# Patient Record
Sex: MALE | Race: NATIVE HAWAIIAN OR OTHER PACIFIC ISLANDER | Employment: OTHER | URBAN - METROPOLITAN AREA
[De-identification: names, ages, dates, MRNs, and addresses within clinical notes are randomized per-mention and may not be internally consistent; named-entity substitution may affect disease eponyms.]

---

## 2018-01-01 ENCOUNTER — APPOINTMENT (OUTPATIENT)
Dept: GENERAL RADIOLOGY | Age: 83
DRG: 283 | End: 2018-01-01
Attending: EMERGENCY MEDICINE
Payer: MEDICARE

## 2018-01-01 ENCOUNTER — HOSPITAL ENCOUNTER (EMERGENCY)
Age: 83
Discharge: HOME | End: 2018-02-14
Attending: EMERGENCY MEDICINE | Admitting: EMERGENCY MEDICINE
Payer: MEDICARE

## 2018-01-01 ENCOUNTER — HOSPITAL ENCOUNTER (INPATIENT)
Age: 83
LOS: 5 days | DRG: 283 | End: 2018-02-25
Attending: EMERGENCY MEDICINE | Admitting: INTERNAL MEDICINE
Payer: MEDICARE

## 2018-01-01 ENCOUNTER — APPOINTMENT (OUTPATIENT)
Dept: CT IMAGING | Age: 83
DRG: 283 | End: 2018-01-01
Attending: INTERNAL MEDICINE
Payer: MEDICARE

## 2018-01-01 ENCOUNTER — APPOINTMENT (OUTPATIENT)
Dept: ULTRASOUND IMAGING | Age: 83
DRG: 283 | End: 2018-01-01
Attending: INTERNAL MEDICINE
Payer: MEDICARE

## 2018-01-01 ENCOUNTER — APPOINTMENT (OUTPATIENT)
Dept: CT IMAGING | Age: 83
End: 2018-01-01
Attending: PHYSICIAN ASSISTANT
Payer: MEDICARE

## 2018-01-01 ENCOUNTER — APPOINTMENT (OUTPATIENT)
Dept: GENERAL RADIOLOGY | Age: 83
DRG: 283 | End: 2018-01-01
Attending: PHYSICIAN ASSISTANT
Payer: MEDICARE

## 2018-01-01 ENCOUNTER — APPOINTMENT (OUTPATIENT)
Dept: GENERAL RADIOLOGY | Age: 83
DRG: 283 | End: 2018-01-01
Attending: INTERNAL MEDICINE
Payer: MEDICARE

## 2018-01-01 ENCOUNTER — APPOINTMENT (OUTPATIENT)
Dept: GENERAL RADIOLOGY | Age: 83
End: 2018-01-01
Attending: PHYSICIAN ASSISTANT
Payer: MEDICARE

## 2018-01-01 ENCOUNTER — APPOINTMENT (OUTPATIENT)
Dept: GENERAL RADIOLOGY | Age: 83
End: 2018-01-01
Attending: EMERGENCY MEDICINE
Payer: MEDICARE

## 2018-01-01 ENCOUNTER — HOSPITAL ENCOUNTER (EMERGENCY)
Age: 83
Discharge: HOME | End: 2018-01-15
Attending: EMERGENCY MEDICINE | Admitting: EMERGENCY MEDICINE
Payer: MEDICARE

## 2018-01-01 VITALS
HEIGHT: 66 IN | WEIGHT: 142.6 LBS | BODY MASS INDEX: 22.92 KG/M2 | TEMPERATURE: 98.2 F | OXYGEN SATURATION: 93 % | RESPIRATION RATE: 42 BRPM | DIASTOLIC BLOOD PRESSURE: 64 MMHG | HEART RATE: 120 BPM | SYSTOLIC BLOOD PRESSURE: 115 MMHG

## 2018-01-01 VITALS
SYSTOLIC BLOOD PRESSURE: 116 MMHG | RESPIRATION RATE: 20 BRPM | BODY MASS INDEX: 29.16 KG/M2 | DIASTOLIC BLOOD PRESSURE: 90 MMHG | OXYGEN SATURATION: 99 % | HEIGHT: 65 IN | TEMPERATURE: 97.8 F | WEIGHT: 175 LBS | HEART RATE: 93 BPM

## 2018-01-01 VITALS
RESPIRATION RATE: 22 BRPM | SYSTOLIC BLOOD PRESSURE: 132 MMHG | TEMPERATURE: 97.9 F | DIASTOLIC BLOOD PRESSURE: 67 MMHG | HEART RATE: 78 BPM | OXYGEN SATURATION: 100 %
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
## 2018-01-01 DIAGNOSIS — S09.90XA CLOSED HEAD INJURY, INITIAL ENCOUNTER: Primary | ICD-10-CM

## 2018-01-01 DIAGNOSIS — N17.9 ACUTE RENAL FAILURE, UNSPECIFIED ACUTE RENAL FAILURE TYPE (HCC): ICD-10-CM

## 2018-01-01 DIAGNOSIS — I50.21 ACUTE SYSTOLIC CONGESTIVE HEART FAILURE (HCC): ICD-10-CM

## 2018-01-01 DIAGNOSIS — I21.4 NSTEMI (NON-ST ELEVATED MYOCARDIAL INFARCTION) (HCC): Primary | ICD-10-CM

## 2018-01-01 DIAGNOSIS — Z71.89 ADVANCE CARE PLANNING: ICD-10-CM

## 2018-01-01 DIAGNOSIS — R09.89 CHOKING EPISODE: Primary | ICD-10-CM

## 2018-01-01 DIAGNOSIS — R06.02 SOB (SHORTNESS OF BREATH): ICD-10-CM

## 2018-01-01 DIAGNOSIS — J81.0 ACUTE PULMONARY EDEMA (HCC): ICD-10-CM

## 2018-01-01 DIAGNOSIS — R53.81 DEBILITY: ICD-10-CM

## 2018-01-01 LAB
ALBUMIN SERPL-MCNC: 2.9 G/DL (ref 3.4–5)
ALBUMIN SERPL-MCNC: 3.3 G/DL (ref 3.4–5)
ALBUMIN SERPL-MCNC: 3.5 G/DL (ref 3.4–5)
ALBUMIN/GLOB SERPL: 0.7 {RATIO} (ref 0.8–1.7)
ALBUMIN/GLOB SERPL: 0.8 {RATIO} (ref 0.8–1.7)
ALBUMIN/GLOB SERPL: 0.9 {RATIO} (ref 0.8–1.7)
ALP SERPL-CCNC: 84 U/L (ref 45–117)
ALP SERPL-CCNC: 90 U/L (ref 45–117)
ALP SERPL-CCNC: 93 U/L (ref 45–117)
ALT SERPL-CCNC: 52 U/L (ref 16–61)
ALT SERPL-CCNC: 60 U/L (ref 16–61)
ALT SERPL-CCNC: 853 U/L (ref 16–61)
ANION GAP SERPL CALC-SCNC: 11 MMOL/L (ref 3–18)
ANION GAP SERPL CALC-SCNC: 12 MMOL/L (ref 3–18)
ANION GAP SERPL CALC-SCNC: 13 MMOL/L (ref 3–18)
ANION GAP SERPL CALC-SCNC: 13 MMOL/L (ref 3–18)
ANION GAP SERPL CALC-SCNC: 14 MMOL/L (ref 3–18)
ANION GAP SERPL CALC-SCNC: 17 MMOL/L (ref 3–18)
APPEARANCE UR: ABNORMAL
APTT PPP: 122.2 SEC (ref 23–36.4)
APTT PPP: 63.1 SEC (ref 23–36.4)
APTT PPP: 64.9 SEC (ref 23–36.4)
APTT PPP: 72.7 SEC (ref 23–36.4)
APTT PPP: 75.6 SEC (ref 23–36.4)
APTT PPP: 77.7 SEC (ref 23–36.4)
APTT PPP: 79 SEC (ref 23–36.4)
APTT PPP: 84.9 SEC (ref 23–36.4)
APTT PPP: 92.2 SEC (ref 23–36.4)
AST SERPL-CCNC: 168 U/L (ref 15–37)
AST SERPL-CCNC: 238 U/L (ref 15–37)
AST SERPL-CCNC: 928 U/L (ref 15–37)
ATRIAL RATE: 108 BPM
ATRIAL RATE: 113 BPM
ATRIAL RATE: 122 BPM
ATRIAL RATE: 122 BPM
ATRIAL RATE: 77 BPM
BACTERIA SPEC CULT: NORMAL
BACTERIA URNS QL MICRO: ABNORMAL /HPF
BASOPHILS # BLD: 0 K/UL (ref 0–0.06)
BASOPHILS # BLD: 0 K/UL (ref 0–0.06)
BASOPHILS NFR BLD: 0 % (ref 0–2)
BASOPHILS NFR BLD: 0 % (ref 0–2)
BILIRUB DIRECT SERPL-MCNC: 0.4 MG/DL (ref 0–0.2)
BILIRUB SERPL-MCNC: 0.5 MG/DL (ref 0.2–1)
BILIRUB SERPL-MCNC: 0.6 MG/DL (ref 0.2–1)
BILIRUB SERPL-MCNC: 0.8 MG/DL (ref 0.2–1)
BILIRUB UR QL: NEGATIVE
BNP SERPL-MCNC: 7699 PG/ML (ref 0–1800)
BNP SERPL-MCNC: ABNORMAL PG/ML (ref 0–1800)
BNP SERPL-MCNC: ABNORMAL PG/ML (ref 0–1800)
BUN SERPL-MCNC: 110 MG/DL (ref 7–18)
BUN SERPL-MCNC: 34 MG/DL (ref 7–18)
BUN SERPL-MCNC: 36 MG/DL (ref 7–18)
BUN SERPL-MCNC: 40 MG/DL (ref 7–18)
BUN SERPL-MCNC: 61 MG/DL (ref 7–18)
BUN SERPL-MCNC: 70 MG/DL (ref 7–18)
BUN SERPL-MCNC: 77 MG/DL (ref 7–18)
BUN SERPL-MCNC: 78 MG/DL (ref 7–18)
BUN/CREAT SERPL: 25 (ref 12–20)
BUN/CREAT SERPL: 28 (ref 12–20)
BUN/CREAT SERPL: 30 (ref 12–20)
BUN/CREAT SERPL: 31 (ref 12–20)
BUN/CREAT SERPL: 32 (ref 12–20)
BUN/CREAT SERPL: 35 (ref 12–20)
BUN/CREAT SERPL: 42 (ref 12–20)
BUN/CREAT SERPL: 42 (ref 12–20)
CALCIUM SERPL-MCNC: 7.9 MG/DL (ref 8.5–10.1)
CALCIUM SERPL-MCNC: 8.3 MG/DL (ref 8.5–10.1)
CALCIUM SERPL-MCNC: 8.4 MG/DL (ref 8.5–10.1)
CALCIUM SERPL-MCNC: 8.7 MG/DL (ref 8.5–10.1)
CALCIUM SERPL-MCNC: 8.7 MG/DL (ref 8.5–10.1)
CALCIUM SERPL-MCNC: 8.8 MG/DL (ref 8.5–10.1)
CALCIUM SERPL-MCNC: 8.9 MG/DL (ref 8.5–10.1)
CALCIUM SERPL-MCNC: 9.1 MG/DL (ref 8.5–10.1)
CALCULATED P AXIS, ECG09: 33 DEGREES
CALCULATED P AXIS, ECG09: 41 DEGREES
CALCULATED P AXIS, ECG09: 43 DEGREES
CALCULATED P AXIS, ECG09: 46 DEGREES
CALCULATED P AXIS, ECG09: 55 DEGREES
CALCULATED R AXIS, ECG10: -7 DEGREES
CALCULATED R AXIS, ECG10: 18 DEGREES
CALCULATED R AXIS, ECG10: 24 DEGREES
CALCULATED R AXIS, ECG10: 25 DEGREES
CALCULATED R AXIS, ECG10: 43 DEGREES
CALCULATED T AXIS, ECG11: -34 DEGREES
CALCULATED T AXIS, ECG11: 17 DEGREES
CALCULATED T AXIS, ECG11: 22 DEGREES
CALCULATED T AXIS, ECG11: 54 DEGREES
CALCULATED T AXIS, ECG11: 84 DEGREES
CHLORIDE SERPL-SCNC: 111 MMOL/L (ref 100–108)
CHLORIDE SERPL-SCNC: 111 MMOL/L (ref 100–108)
CHLORIDE SERPL-SCNC: 112 MMOL/L (ref 100–108)
CHLORIDE SERPL-SCNC: 113 MMOL/L (ref 100–108)
CHLORIDE SERPL-SCNC: 115 MMOL/L (ref 100–108)
CHLORIDE SERPL-SCNC: 122 MMOL/L (ref 100–108)
CHOLEST SERPL-MCNC: 140 MG/DL
CK MB CFR SERPL CALC: 10.4 % (ref 0–4)
CK MB CFR SERPL CALC: 12.7 % (ref 0–4)
CK MB CFR SERPL CALC: 4.2 % (ref 0–4)
CK MB CFR SERPL CALC: 5.1 % (ref 0–4)
CK MB SERPL-MCNC: 10.3 NG/ML (ref 5–25)
CK MB SERPL-MCNC: 143.6 NG/ML (ref 5–25)
CK MB SERPL-MCNC: 151.4 NG/ML (ref 5–25)
CK MB SERPL-MCNC: 59.9 NG/ML (ref 5–25)
CK SERPL-CCNC: 1164 U/L (ref 39–308)
CK SERPL-CCNC: 1192 U/L (ref 39–308)
CK SERPL-CCNC: 1383 U/L (ref 39–308)
CK SERPL-CCNC: 245 U/L (ref 39–308)
CO2 SERPL-SCNC: 17 MMOL/L (ref 21–32)
CO2 SERPL-SCNC: 18 MMOL/L (ref 21–32)
CO2 SERPL-SCNC: 19 MMOL/L (ref 21–32)
CO2 SERPL-SCNC: 20 MMOL/L (ref 21–32)
CO2 SERPL-SCNC: 21 MMOL/L (ref 21–32)
CO2 SERPL-SCNC: 22 MMOL/L (ref 21–32)
CO2 SERPL-SCNC: 23 MMOL/L (ref 21–32)
CO2 SERPL-SCNC: 24 MMOL/L (ref 21–32)
COLOR UR: ABNORMAL
CREAT SERPL-MCNC: 1.15 MG/DL (ref 0.6–1.3)
CREAT SERPL-MCNC: 1.18 MG/DL (ref 0.6–1.3)
CREAT SERPL-MCNC: 1.59 MG/DL (ref 0.6–1.3)
CREAT SERPL-MCNC: 1.86 MG/DL (ref 0.6–1.3)
CREAT SERPL-MCNC: 2.18 MG/DL (ref 0.6–1.3)
CREAT SERPL-MCNC: 2.2 MG/DL (ref 0.6–1.3)
CREAT SERPL-MCNC: 2.21 MG/DL (ref 0.6–1.3)
CREAT SERPL-MCNC: 2.62 MG/DL (ref 0.6–1.3)
DIAGNOSIS, 93000: NORMAL
DIFFERENTIAL METHOD BLD: ABNORMAL
DIFFERENTIAL METHOD BLD: ABNORMAL
EOSINOPHIL # BLD: 0 K/UL (ref 0–0.4)
EOSINOPHIL # BLD: 0 K/UL (ref 0–0.4)
EOSINOPHIL NFR BLD: 0 % (ref 0–5)
EOSINOPHIL NFR BLD: 0 % (ref 0–5)
EPITH CASTS URNS QL MICRO: ABNORMAL /LPF (ref 0–5)
ERYTHROCYTE [DISTWIDTH] IN BLOOD BY AUTOMATED COUNT: 14.9 % (ref 11.6–14.5)
ERYTHROCYTE [DISTWIDTH] IN BLOOD BY AUTOMATED COUNT: 15.1 % (ref 11.6–14.5)
ERYTHROCYTE [DISTWIDTH] IN BLOOD BY AUTOMATED COUNT: 15.2 % (ref 11.6–14.5)
ERYTHROCYTE [DISTWIDTH] IN BLOOD BY AUTOMATED COUNT: 15.3 % (ref 11.6–14.5)
ERYTHROCYTE [DISTWIDTH] IN BLOOD BY AUTOMATED COUNT: 15.3 % (ref 11.6–14.5)
ERYTHROCYTE [DISTWIDTH] IN BLOOD BY AUTOMATED COUNT: 15.5 % (ref 11.6–14.5)
GLOBULIN SER CALC-MCNC: 3.8 G/DL (ref 2–4)
GLOBULIN SER CALC-MCNC: 4 G/DL (ref 2–4)
GLOBULIN SER CALC-MCNC: 4.2 G/DL (ref 2–4)
GLUCOSE SERPL-MCNC: 107 MG/DL (ref 74–99)
GLUCOSE SERPL-MCNC: 111 MG/DL (ref 74–99)
GLUCOSE SERPL-MCNC: 120 MG/DL (ref 74–99)
GLUCOSE SERPL-MCNC: 121 MG/DL (ref 74–99)
GLUCOSE SERPL-MCNC: 127 MG/DL (ref 74–99)
GLUCOSE SERPL-MCNC: 135 MG/DL (ref 74–99)
GLUCOSE SERPL-MCNC: 137 MG/DL (ref 74–99)
GLUCOSE SERPL-MCNC: 266 MG/DL (ref 74–99)
GLUCOSE UR STRIP.AUTO-MCNC: NEGATIVE MG/DL
HBA1C MFR BLD: 5.6 % (ref 4.2–5.6)
HCT VFR BLD AUTO: 36.7 % (ref 36–48)
HCT VFR BLD AUTO: 37.2 % (ref 36–48)
HCT VFR BLD AUTO: 40.9 % (ref 36–48)
HCT VFR BLD AUTO: 41.3 % (ref 36–48)
HCT VFR BLD AUTO: 42.1 % (ref 36–48)
HCT VFR BLD AUTO: 45.3 % (ref 36–48)
HDLC SERPL-MCNC: 57 MG/DL (ref 40–60)
HDLC SERPL: 2.5 {RATIO} (ref 0–5)
HGB BLD-MCNC: 12.2 G/DL (ref 13–16)
HGB BLD-MCNC: 12.2 G/DL (ref 13–16)
HGB BLD-MCNC: 13.5 G/DL (ref 13–16)
HGB BLD-MCNC: 13.7 G/DL (ref 13–16)
HGB BLD-MCNC: 13.8 G/DL (ref 13–16)
HGB BLD-MCNC: 14.5 G/DL (ref 13–16)
HGB UR QL STRIP: ABNORMAL
INR PPP: 1.2 (ref 0.8–1.2)
KETONES UR QL STRIP.AUTO: ABNORMAL MG/DL
LACTATE BLD-SCNC: 2.4 MMOL/L (ref 0.4–2)
LDLC SERPL CALC-MCNC: 66.6 MG/DL (ref 0–100)
LEUKOCYTE ESTERASE UR QL STRIP.AUTO: ABNORMAL
LIPID PROFILE,FLP: NORMAL
LYMPHOCYTES # BLD: 0.7 K/UL (ref 0.9–3.6)
LYMPHOCYTES # BLD: 0.9 K/UL (ref 0.9–3.6)
LYMPHOCYTES NFR BLD: 9 % (ref 21–52)
LYMPHOCYTES NFR BLD: 9 % (ref 21–52)
MAGNESIUM SERPL-MCNC: 2.1 MG/DL (ref 1.6–2.6)
MAGNESIUM SERPL-MCNC: 2.9 MG/DL (ref 1.6–2.6)
MCH RBC QN AUTO: 29.1 PG (ref 24–34)
MCH RBC QN AUTO: 29.5 PG (ref 24–34)
MCH RBC QN AUTO: 29.5 PG (ref 24–34)
MCH RBC QN AUTO: 29.6 PG (ref 24–34)
MCHC RBC AUTO-ENTMCNC: 32 G/DL (ref 31–37)
MCHC RBC AUTO-ENTMCNC: 32.8 G/DL (ref 31–37)
MCHC RBC AUTO-ENTMCNC: 32.8 G/DL (ref 31–37)
MCHC RBC AUTO-ENTMCNC: 33 G/DL (ref 31–37)
MCHC RBC AUTO-ENTMCNC: 33.2 G/DL (ref 31–37)
MCHC RBC AUTO-ENTMCNC: 33.2 G/DL (ref 31–37)
MCV RBC AUTO: 88.8 FL (ref 74–97)
MCV RBC AUTO: 88.9 FL (ref 74–97)
MCV RBC AUTO: 89.2 FL (ref 74–97)
MCV RBC AUTO: 89.5 FL (ref 74–97)
MCV RBC AUTO: 90.3 FL (ref 74–97)
MCV RBC AUTO: 92.4 FL (ref 74–97)
MONOCYTES # BLD: 0.4 K/UL (ref 0.05–1.2)
MONOCYTES # BLD: 0.8 K/UL (ref 0.05–1.2)
MONOCYTES NFR BLD: 4 % (ref 3–10)
MONOCYTES NFR BLD: 8 % (ref 3–10)
NEUTS SEG # BLD: 7 K/UL (ref 1.8–8)
NEUTS SEG # BLD: 8.2 K/UL (ref 1.8–8)
NEUTS SEG NFR BLD: 83 % (ref 40–73)
NEUTS SEG NFR BLD: 87 % (ref 40–73)
NITRITE UR QL STRIP.AUTO: NEGATIVE
P-R INTERVAL, ECG05: 160 MS
P-R INTERVAL, ECG05: 160 MS
P-R INTERVAL, ECG05: 162 MS
P-R INTERVAL, ECG05: 182 MS
P-R INTERVAL, ECG05: 184 MS
PH UR STRIP: 5 [PH] (ref 5–8)
PHOSPHATE SERPL-MCNC: 4 MG/DL (ref 2.5–4.9)
PHOSPHATE SERPL-MCNC: 4.6 MG/DL (ref 2.5–4.9)
PLATELET # BLD AUTO: 149 K/UL (ref 135–420)
PLATELET # BLD AUTO: 217 K/UL (ref 135–420)
PLATELET # BLD AUTO: 238 K/UL (ref 135–420)
PLATELET # BLD AUTO: 244 K/UL (ref 135–420)
PLATELET # BLD AUTO: 247 K/UL (ref 135–420)
PLATELET # BLD AUTO: 251 K/UL (ref 135–420)
PMV BLD AUTO: 11 FL (ref 9.2–11.8)
PMV BLD AUTO: 11 FL (ref 9.2–11.8)
PMV BLD AUTO: 11.6 FL (ref 9.2–11.8)
PMV BLD AUTO: 11.7 FL (ref 9.2–11.8)
PMV BLD AUTO: 11.8 FL (ref 9.2–11.8)
PMV BLD AUTO: 12 FL (ref 9.2–11.8)
POTASSIUM SERPL-SCNC: 3.9 MMOL/L (ref 3.5–5.5)
POTASSIUM SERPL-SCNC: 4.1 MMOL/L (ref 3.5–5.5)
POTASSIUM SERPL-SCNC: 4.3 MMOL/L (ref 3.5–5.5)
POTASSIUM SERPL-SCNC: 4.3 MMOL/L (ref 3.5–5.5)
POTASSIUM SERPL-SCNC: 4.4 MMOL/L (ref 3.5–5.5)
POTASSIUM SERPL-SCNC: 4.6 MMOL/L (ref 3.5–5.5)
PROT SERPL-MCNC: 6.9 G/DL (ref 6.4–8.2)
PROT SERPL-MCNC: 7.1 G/DL (ref 6.4–8.2)
PROT SERPL-MCNC: 7.7 G/DL (ref 6.4–8.2)
PROT UR STRIP-MCNC: 100 MG/DL
PROTHROMBIN TIME: 14.6 SEC (ref 11.5–15.2)
Q-T INTERVAL, ECG07: 326 MS
Q-T INTERVAL, ECG07: 328 MS
Q-T INTERVAL, ECG07: 336 MS
Q-T INTERVAL, ECG07: 344 MS
Q-T INTERVAL, ECG07: 358 MS
QRS DURATION, ECG06: 100 MS
QRS DURATION, ECG06: 104 MS
QRS DURATION, ECG06: 86 MS
QRS DURATION, ECG06: 88 MS
QRS DURATION, ECG06: 92 MS
QTC CALCULATION (BEZET), ECG08: 405 MS
QTC CALCULATION (BEZET), ECG08: 460 MS
QTC CALCULATION (BEZET), ECG08: 460 MS
QTC CALCULATION (BEZET), ECG08: 464 MS
QTC CALCULATION (BEZET), ECG08: 467 MS
RBC # BLD AUTO: 4.13 M/UL (ref 4.7–5.5)
RBC # BLD AUTO: 4.19 M/UL (ref 4.7–5.5)
RBC # BLD AUTO: 4.57 M/UL (ref 4.7–5.5)
RBC # BLD AUTO: 4.63 M/UL (ref 4.7–5.5)
RBC # BLD AUTO: 4.66 M/UL (ref 4.7–5.5)
RBC # BLD AUTO: 4.9 M/UL (ref 4.7–5.5)
RBC #/AREA URNS HPF: ABNORMAL /HPF (ref 0–5)
SERVICE CMNT-IMP: NORMAL
SODIUM SERPL-SCNC: 146 MMOL/L (ref 136–145)
SODIUM SERPL-SCNC: 146 MMOL/L (ref 136–145)
SODIUM SERPL-SCNC: 147 MMOL/L (ref 136–145)
SODIUM SERPL-SCNC: 147 MMOL/L (ref 136–145)
SODIUM SERPL-SCNC: 148 MMOL/L (ref 136–145)
SODIUM SERPL-SCNC: 148 MMOL/L (ref 136–145)
SODIUM SERPL-SCNC: 149 MMOL/L (ref 136–145)
SODIUM SERPL-SCNC: 151 MMOL/L (ref 136–145)
SP GR UR REFRACTOMETRY: 1.03 (ref 1–1.03)
T4 FREE SERPL-MCNC: 1.4 NG/DL (ref 0.7–1.5)
TRIGL SERPL-MCNC: 82 MG/DL (ref ?–150)
TROPONIN I SERPL-MCNC: 10.2 NG/ML (ref 0–0.04)
TROPONIN I SERPL-MCNC: 15.1 NG/ML (ref 0–0.04)
TROPONIN I SERPL-MCNC: 25.7 NG/ML (ref 0–0.04)
TROPONIN I SERPL-MCNC: 36 NG/ML (ref 0–0.04)
TROPONIN I SERPL-MCNC: 45.3 NG/ML (ref 0–0.04)
TROPONIN I SERPL-MCNC: 50.9 NG/ML (ref 0–0.04)
TSH SERPL DL<=0.05 MIU/L-ACNC: 1.41 UIU/ML (ref 0.36–3.74)
TSH SERPL DL<=0.05 MIU/L-ACNC: 1.5 UIU/ML (ref 0.36–3.74)
UROBILINOGEN UR QL STRIP.AUTO: 1 EU/DL (ref 0.2–1)
VENTRICULAR RATE, ECG03: 108 BPM
VENTRICULAR RATE, ECG03: 113 BPM
VENTRICULAR RATE, ECG03: 122 BPM
VENTRICULAR RATE, ECG03: 122 BPM
VENTRICULAR RATE, ECG03: 77 BPM
VLDLC SERPL CALC-MCNC: 16.4 MG/DL
WBC # BLD AUTO: 10.3 K/UL (ref 4.6–13.2)
WBC # BLD AUTO: 10.3 K/UL (ref 4.6–13.2)
WBC # BLD AUTO: 10.5 K/UL (ref 4.6–13.2)
WBC # BLD AUTO: 7.9 K/UL (ref 4.6–13.2)
WBC # BLD AUTO: 8.1 K/UL (ref 4.6–13.2)
WBC # BLD AUTO: 9.9 K/UL (ref 4.6–13.2)
WBC URNS QL MICRO: ABNORMAL /HPF (ref 0–4)

## 2018-01-01 PROCEDURE — 74011250636 HC RX REV CODE- 250/636: Performed by: INTERNAL MEDICINE

## 2018-01-01 PROCEDURE — 93005 ELECTROCARDIOGRAM TRACING: CPT

## 2018-01-01 PROCEDURE — 74011250636 HC RX REV CODE- 250/636: Performed by: EMERGENCY MEDICINE

## 2018-01-01 PROCEDURE — 84439 ASSAY OF FREE THYROXINE: CPT | Performed by: INTERNAL MEDICINE

## 2018-01-01 PROCEDURE — 74011250636 HC RX REV CODE- 250/636

## 2018-01-01 PROCEDURE — 82550 ASSAY OF CK (CPK): CPT | Performed by: INTERNAL MEDICINE

## 2018-01-01 PROCEDURE — 85730 THROMBOPLASTIN TIME PARTIAL: CPT | Performed by: INTERNAL MEDICINE

## 2018-01-01 PROCEDURE — 81001 URINALYSIS AUTO W/SCOPE: CPT | Performed by: EMERGENCY MEDICINE

## 2018-01-01 PROCEDURE — 74011250637 HC RX REV CODE- 250/637: Performed by: INTERNAL MEDICINE

## 2018-01-01 PROCEDURE — 94660 CPAP INITIATION&MGMT: CPT

## 2018-01-01 PROCEDURE — 96365 THER/PROPH/DIAG IV INF INIT: CPT

## 2018-01-01 PROCEDURE — 83605 ASSAY OF LACTIC ACID: CPT

## 2018-01-01 PROCEDURE — 76775 US EXAM ABDO BACK WALL LIM: CPT

## 2018-01-01 PROCEDURE — 85027 COMPLETE CBC AUTOMATED: CPT | Performed by: INTERNAL MEDICINE

## 2018-01-01 PROCEDURE — 80048 BASIC METABOLIC PNL TOTAL CA: CPT | Performed by: INTERNAL MEDICINE

## 2018-01-01 PROCEDURE — 74011250637 HC RX REV CODE- 250/637: Performed by: PHYSICIAN ASSISTANT

## 2018-01-01 PROCEDURE — 85025 COMPLETE CBC W/AUTO DIFF WBC: CPT | Performed by: INTERNAL MEDICINE

## 2018-01-01 PROCEDURE — 74011250636 HC RX REV CODE- 250/636: Performed by: PHYSICIAN ASSISTANT

## 2018-01-01 PROCEDURE — 83735 ASSAY OF MAGNESIUM: CPT | Performed by: INTERNAL MEDICINE

## 2018-01-01 PROCEDURE — 83036 HEMOGLOBIN GLYCOSYLATED A1C: CPT | Performed by: INTERNAL MEDICINE

## 2018-01-01 PROCEDURE — 80061 LIPID PANEL: CPT | Performed by: INTERNAL MEDICINE

## 2018-01-01 PROCEDURE — 77030029684 HC NEB SM VOL KT MONA -A

## 2018-01-01 PROCEDURE — 83880 ASSAY OF NATRIURETIC PEPTIDE: CPT | Performed by: EMERGENCY MEDICINE

## 2018-01-01 PROCEDURE — 80053 COMPREHEN METABOLIC PANEL: CPT | Performed by: EMERGENCY MEDICINE

## 2018-01-01 PROCEDURE — 83880 ASSAY OF NATRIURETIC PEPTIDE: CPT | Performed by: INTERNAL MEDICINE

## 2018-01-01 PROCEDURE — 94640 AIRWAY INHALATION TREATMENT: CPT

## 2018-01-01 PROCEDURE — 73030 X-RAY EXAM OF SHOULDER: CPT

## 2018-01-01 PROCEDURE — 77030011256 HC DRSG MEPILEX <16IN NO BORD MOLN -A

## 2018-01-01 PROCEDURE — 36415 COLL VENOUS BLD VENIPUNCTURE: CPT | Performed by: INTERNAL MEDICINE

## 2018-01-01 PROCEDURE — 74011000250 HC RX REV CODE- 250: Performed by: INTERNAL MEDICINE

## 2018-01-01 PROCEDURE — 74011000250 HC RX REV CODE- 250: Performed by: PHYSICIAN ASSISTANT

## 2018-01-01 PROCEDURE — 96361 HYDRATE IV INFUSION ADD-ON: CPT

## 2018-01-01 PROCEDURE — 80048 BASIC METABOLIC PNL TOTAL CA: CPT | Performed by: PHYSICIAN ASSISTANT

## 2018-01-01 PROCEDURE — 77010033678 HC OXYGEN DAILY

## 2018-01-01 PROCEDURE — 65610000006 HC RM INTENSIVE CARE

## 2018-01-01 PROCEDURE — 74011250637 HC RX REV CODE- 250/637: Performed by: EMERGENCY MEDICINE

## 2018-01-01 PROCEDURE — 72125 CT NECK SPINE W/O DYE: CPT

## 2018-01-01 PROCEDURE — 74011000258 HC RX REV CODE- 258: Performed by: PHYSICIAN ASSISTANT

## 2018-01-01 PROCEDURE — 71045 X-RAY EXAM CHEST 1 VIEW: CPT

## 2018-01-01 PROCEDURE — 80053 COMPREHEN METABOLIC PANEL: CPT | Performed by: INTERNAL MEDICINE

## 2018-01-01 PROCEDURE — 93306 TTE W/DOPPLER COMPLETE: CPT

## 2018-01-01 PROCEDURE — 74011000250 HC RX REV CODE- 250

## 2018-01-01 PROCEDURE — 99285 EMERGENCY DEPT VISIT HI MDM: CPT

## 2018-01-01 PROCEDURE — 84484 ASSAY OF TROPONIN QUANT: CPT | Performed by: EMERGENCY MEDICINE

## 2018-01-01 PROCEDURE — 84100 ASSAY OF PHOSPHORUS: CPT | Performed by: INTERNAL MEDICINE

## 2018-01-01 PROCEDURE — 77030018846 HC SOL IRR STRL H20 ICUM -A

## 2018-01-01 PROCEDURE — 74011000255 HC RX REV CODE- 255: Performed by: INTERNAL MEDICINE

## 2018-01-01 PROCEDURE — 87040 BLOOD CULTURE FOR BACTERIA: CPT | Performed by: EMERGENCY MEDICINE

## 2018-01-01 PROCEDURE — 70450 CT HEAD/BRAIN W/O DYE: CPT

## 2018-01-01 PROCEDURE — C9113 INJ PANTOPRAZOLE SODIUM, VIA: HCPCS | Performed by: INTERNAL MEDICINE

## 2018-01-01 PROCEDURE — 71046 X-RAY EXAM CHEST 2 VIEWS: CPT

## 2018-01-01 PROCEDURE — 99284 EMERGENCY DEPT VISIT MOD MDM: CPT

## 2018-01-01 PROCEDURE — 65660000000 HC RM CCU STEPDOWN

## 2018-01-01 PROCEDURE — 87086 URINE CULTURE/COLONY COUNT: CPT | Performed by: INTERNAL MEDICINE

## 2018-01-01 PROCEDURE — 76450000000

## 2018-01-01 PROCEDURE — 92611 MOTION FLUOROSCOPY/SWALLOW: CPT

## 2018-01-01 PROCEDURE — 74230 X-RAY XM SWLNG FUNCJ C+: CPT

## 2018-01-01 PROCEDURE — 85610 PROTHROMBIN TIME: CPT | Performed by: INTERNAL MEDICINE

## 2018-01-01 PROCEDURE — 71250 CT THORAX DX C-: CPT

## 2018-01-01 PROCEDURE — 92526 ORAL FUNCTION THERAPY: CPT

## 2018-01-01 PROCEDURE — 92610 EVALUATE SWALLOWING FUNCTION: CPT

## 2018-01-01 PROCEDURE — 80076 HEPATIC FUNCTION PANEL: CPT | Performed by: INTERNAL MEDICINE

## 2018-01-01 PROCEDURE — 94762 N-INVAS EAR/PLS OXIMTRY CONT: CPT

## 2018-01-01 PROCEDURE — 85025 COMPLETE CBC W/AUTO DIFF WBC: CPT | Performed by: EMERGENCY MEDICINE

## 2018-01-01 PROCEDURE — 84443 ASSAY THYROID STIM HORMONE: CPT | Performed by: INTERNAL MEDICINE

## 2018-01-01 RX ORDER — DIGOXIN 0.25 MG/ML
250 INJECTION INTRAMUSCULAR; INTRAVENOUS
Status: COMPLETED | OUTPATIENT
Start: 2018-01-01 | End: 2018-01-01

## 2018-01-01 RX ORDER — CLOPIDOGREL BISULFATE 75 MG/1
75 TABLET ORAL DAILY
Status: DISCONTINUED | OUTPATIENT
Start: 2018-01-01 | End: 2018-01-01 | Stop reason: HOSPADM

## 2018-01-01 RX ORDER — IPRATROPIUM BROMIDE AND ALBUTEROL SULFATE 2.5; .5 MG/3ML; MG/3ML
SOLUTION RESPIRATORY (INHALATION)
Status: COMPLETED
Start: 2018-01-01 | End: 2018-01-01

## 2018-01-01 RX ORDER — MORPHINE SULFATE 2 MG/ML
2 INJECTION, SOLUTION INTRAMUSCULAR; INTRAVENOUS
Status: DISCONTINUED | OUTPATIENT
Start: 2018-01-01 | End: 2018-01-01 | Stop reason: SDUPTHER

## 2018-01-01 RX ORDER — MORPHINE SULFATE 4 MG/ML
1 INJECTION, SOLUTION INTRAMUSCULAR; INTRAVENOUS ONCE
Status: COMPLETED | OUTPATIENT
Start: 2018-01-01 | End: 2018-01-01

## 2018-01-01 RX ORDER — ONDANSETRON 2 MG/ML
4 INJECTION INTRAMUSCULAR; INTRAVENOUS
Status: DISCONTINUED | OUTPATIENT
Start: 2018-01-01 | End: 2018-01-01 | Stop reason: HOSPADM

## 2018-01-01 RX ORDER — SODIUM CHLORIDE 0.9 % (FLUSH) 0.9 %
5-10 SYRINGE (ML) INJECTION EVERY 8 HOURS
Status: DISCONTINUED | OUTPATIENT
Start: 2018-01-01 | End: 2018-01-01 | Stop reason: HOSPADM

## 2018-01-01 RX ORDER — METOPROLOL TARTRATE 25 MG/1
12.5 TABLET, FILM COATED ORAL EVERY 8 HOURS
Status: DISCONTINUED | OUTPATIENT
Start: 2018-01-01 | End: 2018-01-01

## 2018-01-01 RX ORDER — NITROGLYCERIN 40 MG/100ML
0-20 INJECTION INTRAVENOUS
Status: DISCONTINUED | OUTPATIENT
Start: 2018-01-01 | End: 2018-01-01

## 2018-01-01 RX ORDER — GUAIFENESIN 100 MG/5ML
324 LIQUID (ML) ORAL
Status: DISPENSED | OUTPATIENT
Start: 2018-01-01 | End: 2018-01-01

## 2018-01-01 RX ORDER — FUROSEMIDE 10 MG/ML
40 INJECTION INTRAMUSCULAR; INTRAVENOUS ONCE
Status: COMPLETED | OUTPATIENT
Start: 2018-01-01 | End: 2018-01-01

## 2018-01-01 RX ORDER — METOPROLOL TARTRATE 5 MG/5ML
2.5 INJECTION INTRAVENOUS ONCE
Status: COMPLETED | OUTPATIENT
Start: 2018-01-01 | End: 2018-01-01

## 2018-01-01 RX ORDER — PANTOPRAZOLE SODIUM 40 MG/1
40 TABLET, DELAYED RELEASE ORAL
Status: DISCONTINUED | OUTPATIENT
Start: 2018-01-01 | End: 2018-01-01 | Stop reason: HOSPADM

## 2018-01-01 RX ORDER — FUROSEMIDE 40 MG/1
20 TABLET ORAL DAILY
Status: DISCONTINUED | OUTPATIENT
Start: 2018-01-01 | End: 2018-01-01 | Stop reason: HOSPADM

## 2018-01-01 RX ORDER — METOPROLOL TARTRATE 25 MG/1
25 TABLET, FILM COATED ORAL EVERY 8 HOURS
Status: DISCONTINUED | OUTPATIENT
Start: 2018-01-01 | End: 2018-01-01

## 2018-01-01 RX ORDER — FUROSEMIDE 10 MG/ML
20 INJECTION INTRAMUSCULAR; INTRAVENOUS
Status: COMPLETED | OUTPATIENT
Start: 2018-01-01 | End: 2018-01-01

## 2018-01-01 RX ORDER — LORAZEPAM 2 MG/ML
1 INJECTION INTRAMUSCULAR ONCE
Status: COMPLETED | OUTPATIENT
Start: 2018-01-01 | End: 2018-01-01

## 2018-01-01 RX ORDER — ASPIRIN 300 MG/1
300 SUPPOSITORY RECTAL
Status: COMPLETED | OUTPATIENT
Start: 2018-01-01 | End: 2018-01-01

## 2018-01-01 RX ORDER — ERGOCALCIFEROL 1.25 MG/1
50000 CAPSULE ORAL
COMMUNITY

## 2018-01-01 RX ORDER — IPRATROPIUM BROMIDE AND ALBUTEROL SULFATE 2.5; .5 MG/3ML; MG/3ML
3 SOLUTION RESPIRATORY (INHALATION)
Status: DISCONTINUED | OUTPATIENT
Start: 2018-01-01 | End: 2018-01-01 | Stop reason: HOSPADM

## 2018-01-01 RX ORDER — ADHESIVE BANDAGE
30 BANDAGE TOPICAL DAILY PRN
Status: DISCONTINUED | OUTPATIENT
Start: 2018-01-01 | End: 2018-01-01 | Stop reason: HOSPADM

## 2018-01-01 RX ORDER — ASPIRIN 81 MG/1
81 TABLET ORAL DAILY
Status: DISCONTINUED | OUTPATIENT
Start: 2018-01-01 | End: 2018-01-01 | Stop reason: HOSPADM

## 2018-01-01 RX ORDER — SODIUM CHLORIDE 0.9 % (FLUSH) 0.9 %
5-10 SYRINGE (ML) INJECTION AS NEEDED
Status: DISCONTINUED | OUTPATIENT
Start: 2018-01-01 | End: 2018-01-01 | Stop reason: HOSPADM

## 2018-01-01 RX ORDER — ACETAMINOPHEN 500 MG
500 TABLET ORAL
Status: DISCONTINUED | OUTPATIENT
Start: 2018-01-01 | End: 2018-01-01 | Stop reason: HOSPADM

## 2018-01-01 RX ORDER — HEPARIN SODIUM 10000 [USP'U]/100ML
12-25 INJECTION, SOLUTION INTRAVENOUS
Status: DISCONTINUED | OUTPATIENT
Start: 2018-01-01 | End: 2018-01-01

## 2018-01-01 RX ORDER — METOPROLOL TARTRATE 50 MG/1
50 TABLET ORAL 2 TIMES DAILY
Status: DISCONTINUED | OUTPATIENT
Start: 2018-01-01 | End: 2018-01-01 | Stop reason: HOSPADM

## 2018-01-01 RX ORDER — METOPROLOL TARTRATE 5 MG/5ML
2.5 INJECTION INTRAVENOUS
Status: COMPLETED | OUTPATIENT
Start: 2018-01-01 | End: 2018-01-01

## 2018-01-01 RX ORDER — METOPROLOL TARTRATE 5 MG/5ML
INJECTION INTRAVENOUS
Status: COMPLETED
Start: 2018-01-01 | End: 2018-01-01

## 2018-01-01 RX ORDER — FUROSEMIDE 10 MG/ML
INJECTION INTRAMUSCULAR; INTRAVENOUS
Status: COMPLETED
Start: 2018-01-01 | End: 2018-01-01

## 2018-01-01 RX ORDER — ATORVASTATIN CALCIUM 10 MG/1
10 TABLET, FILM COATED ORAL DAILY
COMMUNITY

## 2018-01-01 RX ORDER — HEPARIN SODIUM 1000 [USP'U]/ML
58.8 INJECTION, SOLUTION INTRAVENOUS; SUBCUTANEOUS ONCE
Status: COMPLETED | OUTPATIENT
Start: 2018-01-01 | End: 2018-01-01

## 2018-01-01 RX ORDER — IPRATROPIUM BROMIDE AND ALBUTEROL SULFATE 2.5; .5 MG/3ML; MG/3ML
3 SOLUTION RESPIRATORY (INHALATION)
Status: COMPLETED | OUTPATIENT
Start: 2018-01-01 | End: 2018-01-01

## 2018-01-01 RX ORDER — DOCUSATE SODIUM 100 MG/1
100 CAPSULE, LIQUID FILLED ORAL 2 TIMES DAILY
Status: DISCONTINUED | OUTPATIENT
Start: 2018-01-01 | End: 2018-01-01 | Stop reason: HOSPADM

## 2018-01-01 RX ORDER — MORPHINE SULFATE 4 MG/ML
2 INJECTION, SOLUTION INTRAMUSCULAR; INTRAVENOUS
Status: DISCONTINUED | OUTPATIENT
Start: 2018-01-01 | End: 2018-01-01 | Stop reason: HOSPADM

## 2018-01-01 RX ORDER — VANCOMYCIN/0.9 % SOD CHLORIDE 1 G/100 ML
1000 PLASTIC BAG, INJECTION (ML) INTRAVENOUS
Status: DISCONTINUED | OUTPATIENT
Start: 2018-01-01 | End: 2018-01-01

## 2018-01-01 RX ORDER — MORPHINE SULFATE 4 MG/ML
1 INJECTION, SOLUTION INTRAMUSCULAR; INTRAVENOUS ONCE
Status: DISCONTINUED | OUTPATIENT
Start: 2018-01-01 | End: 2018-01-01 | Stop reason: SDUPTHER

## 2018-01-01 RX ORDER — NITROGLYCERIN 0.4 MG/1
0.4 TABLET SUBLINGUAL
Status: DISCONTINUED | OUTPATIENT
Start: 2018-01-01 | End: 2018-01-01 | Stop reason: HOSPADM

## 2018-01-01 RX ORDER — HEPARIN SODIUM 1000 [USP'U]/ML
3000 INJECTION, SOLUTION INTRAVENOUS; SUBCUTANEOUS ONCE
Status: COMPLETED | OUTPATIENT
Start: 2018-01-01 | End: 2018-01-01

## 2018-01-01 RX ORDER — ACETAMINOPHEN 500 MG
500 TABLET ORAL
COMMUNITY

## 2018-01-01 RX ORDER — METOPROLOL TARTRATE 5 MG/5ML
2.5 INJECTION INTRAVENOUS
Status: DISCONTINUED | OUTPATIENT
Start: 2018-01-01 | End: 2018-01-01 | Stop reason: HOSPADM

## 2018-01-01 RX ORDER — LORAZEPAM 2 MG/ML
INJECTION INTRAMUSCULAR
Status: COMPLETED
Start: 2018-01-01 | End: 2018-01-01

## 2018-01-01 RX ORDER — DIGOXIN 125 MCG
0.25 TABLET ORAL DAILY
Status: DISCONTINUED | OUTPATIENT
Start: 2018-01-01 | End: 2018-01-01

## 2018-01-01 RX ORDER — VANCOMYCIN/0.9 % SOD CHLORIDE 1 G/100 ML
1000 PLASTIC BAG, INJECTION (ML) INTRAVENOUS ONCE
Status: DISCONTINUED | OUTPATIENT
Start: 2018-01-01 | End: 2018-01-01

## 2018-01-01 RX ORDER — HEPARIN SODIUM 10000 [USP'U]/100ML
INJECTION, SOLUTION INTRAVENOUS
Status: COMPLETED
Start: 2018-01-01 | End: 2018-01-01

## 2018-01-01 RX ORDER — METOPROLOL TARTRATE 25 MG/1
25 TABLET, FILM COATED ORAL EVERY 6 HOURS
Status: DISCONTINUED | OUTPATIENT
Start: 2018-01-01 | End: 2018-01-01

## 2018-01-01 RX ORDER — LEVOFLOXACIN 5 MG/ML
750 INJECTION, SOLUTION INTRAVENOUS
Status: DISCONTINUED | OUTPATIENT
Start: 2018-01-01 | End: 2018-01-01

## 2018-01-01 RX ORDER — ATORVASTATIN CALCIUM 40 MG/1
40 TABLET, FILM COATED ORAL
Status: DISCONTINUED | OUTPATIENT
Start: 2018-01-01 | End: 2018-01-01 | Stop reason: HOSPADM

## 2018-01-01 RX ORDER — LEVOFLOXACIN 5 MG/ML
750 INJECTION, SOLUTION INTRAVENOUS EVERY 24 HOURS
Status: DISCONTINUED | OUTPATIENT
Start: 2018-01-01 | End: 2018-01-01

## 2018-01-01 RX ADMIN — ASPIRIN 81 MG: 81 TABLET, COATED ORAL at 08:18

## 2018-01-01 RX ADMIN — WATER 1 G: 1 INJECTION INTRAMUSCULAR; INTRAVENOUS; SUBCUTANEOUS at 13:23

## 2018-01-01 RX ADMIN — METOPROLOL TARTRATE 2.5 MG: 5 INJECTION, SOLUTION INTRAVENOUS at 11:38

## 2018-01-01 RX ADMIN — DIGOXIN 0.25 MG: 125 TABLET ORAL at 08:02

## 2018-01-01 RX ADMIN — METOPROLOL TARTRATE 25 MG: 25 TABLET ORAL at 11:29

## 2018-01-01 RX ADMIN — METOPROLOL TARTRATE 25 MG: 25 TABLET ORAL at 23:51

## 2018-01-01 RX ADMIN — HEPARIN SODIUM AND DEXTROSE 15 UNITS/KG/HR: 10000; 5 INJECTION INTRAVENOUS at 01:56

## 2018-01-01 RX ADMIN — PANTOPRAZOLE SODIUM 40 MG: 40 TABLET, DELAYED RELEASE ORAL at 08:24

## 2018-01-01 RX ADMIN — METOPROLOL TARTRATE 50 MG: 50 TABLET ORAL at 08:18

## 2018-01-01 RX ADMIN — Medication 10 ML: at 23:01

## 2018-01-01 RX ADMIN — FUROSEMIDE 20 MG: 40 TABLET ORAL at 08:18

## 2018-01-01 RX ADMIN — METOPROLOL TARTRATE 25 MG: 25 TABLET ORAL at 01:02

## 2018-01-01 RX ADMIN — HEPARIN SODIUM 3000 UNITS: 1000 INJECTION, SOLUTION INTRAVENOUS; SUBCUTANEOUS at 14:00

## 2018-01-01 RX ADMIN — Medication 10 ML: at 13:31

## 2018-01-01 RX ADMIN — ASPIRIN 81 MG: 81 TABLET, COATED ORAL at 11:00

## 2018-01-01 RX ADMIN — METOPROLOL TARTRATE 2.5 MG: 5 INJECTION, SOLUTION INTRAVENOUS at 13:21

## 2018-01-01 RX ADMIN — METOPROLOL TARTRATE 50 MG: 50 TABLET ORAL at 19:27

## 2018-01-01 RX ADMIN — DOCUSATE SODIUM 100 MG: 100 CAPSULE, LIQUID FILLED ORAL at 18:00

## 2018-01-01 RX ADMIN — LORAZEPAM 1 MG: 2 INJECTION INTRAMUSCULAR at 02:48

## 2018-01-01 RX ADMIN — CLOPIDOGREL BISULFATE 75 MG: 75 TABLET ORAL at 08:18

## 2018-01-01 RX ADMIN — HEPARIN SODIUM 12 UNITS/KG/HR: 10000 INJECTION, SOLUTION INTRAVENOUS at 09:00

## 2018-01-01 RX ADMIN — ASPIRIN 81 MG: 81 TABLET, COATED ORAL at 08:24

## 2018-01-01 RX ADMIN — LEVOFLOXACIN 750 MG: 5 INJECTION, SOLUTION INTRAVENOUS at 07:08

## 2018-01-01 RX ADMIN — IPRATROPIUM BROMIDE AND ALBUTEROL SULFATE 3 ML: .5; 3 SOLUTION RESPIRATORY (INHALATION) at 21:59

## 2018-01-01 RX ADMIN — DOCUSATE SODIUM 100 MG: 100 CAPSULE, LIQUID FILLED ORAL at 19:27

## 2018-01-01 RX ADMIN — Medication 10 ML: at 06:26

## 2018-01-01 RX ADMIN — ONDANSETRON 4 MG: 2 INJECTION INTRAMUSCULAR; INTRAVENOUS at 13:59

## 2018-01-01 RX ADMIN — MORPHINE SULFATE 1 MG: 4 INJECTION, SOLUTION INTRAMUSCULAR; INTRAVENOUS at 11:48

## 2018-01-01 RX ADMIN — METOPROLOL TARTRATE 2 MG: 5 INJECTION INTRAVENOUS at 08:28

## 2018-01-01 RX ADMIN — DOCUSATE SODIUM 100 MG: 100 CAPSULE, LIQUID FILLED ORAL at 08:02

## 2018-01-01 RX ADMIN — DIGOXIN 250 MCG: 250 INJECTION, SOLUTION INTRAMUSCULAR; INTRAVENOUS; PARENTERAL at 15:37

## 2018-01-01 RX ADMIN — NITROGLYCERIN 10 MCG/MIN: 40 INJECTION INTRAVENOUS at 13:28

## 2018-01-01 RX ADMIN — FUROSEMIDE 40 MG: 10 INJECTION, SOLUTION INTRAMUSCULAR; INTRAVENOUS at 08:49

## 2018-01-01 RX ADMIN — METOPROLOL TARTRATE 25 MG: 25 TABLET ORAL at 14:06

## 2018-01-01 RX ADMIN — CLOPIDOGREL BISULFATE 75 MG: 75 TABLET ORAL at 11:15

## 2018-01-01 RX ADMIN — BARIUM SULFATE 15 ML: 400 PASTE ORAL at 10:35

## 2018-01-01 RX ADMIN — DOCUSATE SODIUM 100 MG: 100 CAPSULE, LIQUID FILLED ORAL at 10:11

## 2018-01-01 RX ADMIN — Medication 10 ML: at 19:28

## 2018-01-01 RX ADMIN — ASPIRIN 300 MG: 300 SUPPOSITORY RECTAL at 09:29

## 2018-01-01 RX ADMIN — IPRATROPIUM BROMIDE AND ALBUTEROL SULFATE 3 ML: .5; 3 SOLUTION RESPIRATORY (INHALATION) at 05:56

## 2018-01-01 RX ADMIN — METOPROLOL TARTRATE 25 MG: 25 TABLET ORAL at 12:07

## 2018-01-01 RX ADMIN — METOPROLOL TARTRATE 2.5 MG: 5 INJECTION INTRAVENOUS at 15:25

## 2018-01-01 RX ADMIN — LORAZEPAM 1 MG: 2 INJECTION INTRAMUSCULAR; INTRAVENOUS at 02:48

## 2018-01-01 RX ADMIN — DOCUSATE SODIUM 100 MG: 100 CAPSULE, LIQUID FILLED ORAL at 17:13

## 2018-01-01 RX ADMIN — PANTOPRAZOLE SODIUM 40 MG: 40 TABLET, DELAYED RELEASE ORAL at 11:00

## 2018-01-01 RX ADMIN — HEPARIN SODIUM AND DEXTROSE 13 UNITS/KG/HR: 10000; 5 INJECTION INTRAVENOUS at 16:02

## 2018-01-01 RX ADMIN — BARIUM SULFATE 15 ML: 400 SUSPENSION ORAL at 10:36

## 2018-01-01 RX ADMIN — DOCUSATE SODIUM 100 MG: 100 CAPSULE, LIQUID FILLED ORAL at 08:18

## 2018-01-01 RX ADMIN — DOCUSATE SODIUM 100 MG: 100 CAPSULE, LIQUID FILLED ORAL at 18:43

## 2018-01-01 RX ADMIN — Medication 10 ML: at 21:59

## 2018-01-01 RX ADMIN — ASPIRIN 81 MG: 81 TABLET, COATED ORAL at 08:02

## 2018-01-01 RX ADMIN — METOPROLOL TARTRATE 25 MG: 25 TABLET ORAL at 05:53

## 2018-01-01 RX ADMIN — PANTOPRAZOLE SODIUM 40 MG: 40 TABLET, DELAYED RELEASE ORAL at 10:11

## 2018-01-01 RX ADMIN — IPRATROPIUM BROMIDE AND ALBUTEROL SULFATE 3 ML: 2.5; .5 SOLUTION RESPIRATORY (INHALATION) at 05:56

## 2018-01-01 RX ADMIN — FUROSEMIDE 40 MG: 10 INJECTION, SOLUTION INTRAMUSCULAR; INTRAVENOUS at 18:00

## 2018-01-01 RX ADMIN — BARIUM SULFATE 30 ML: 0.81 POWDER, FOR SUSPENSION ORAL at 10:35

## 2018-01-01 RX ADMIN — HEPARIN SODIUM AND DEXTROSE 13 UNITS/KG/HR: 10000; 5 INJECTION INTRAVENOUS at 19:47

## 2018-01-01 RX ADMIN — WATER 1 G: 1 INJECTION INTRAMUSCULAR; INTRAVENOUS; SUBCUTANEOUS at 14:06

## 2018-01-01 RX ADMIN — METOPROLOL TARTRATE 25 MG: 25 TABLET ORAL at 05:10

## 2018-01-01 RX ADMIN — METOPROLOL TARTRATE 25 MG: 25 TABLET ORAL at 18:43

## 2018-01-01 RX ADMIN — WATER 1 G: 1 INJECTION INTRAMUSCULAR; INTRAVENOUS; SUBCUTANEOUS at 14:28

## 2018-01-01 RX ADMIN — Medication 10 ML: at 11:02

## 2018-01-01 RX ADMIN — ATORVASTATIN CALCIUM 40 MG: 40 TABLET, FILM COATED ORAL at 21:55

## 2018-01-01 RX ADMIN — SODIUM CHLORIDE 1000 ML: 900 INJECTION, SOLUTION INTRAVENOUS at 06:26

## 2018-01-01 RX ADMIN — Medication 10 ML: at 14:06

## 2018-01-01 RX ADMIN — HEPARIN SODIUM AND DEXTROSE 12 UNITS/KG/HR: 10000; 5 INJECTION INTRAVENOUS at 09:00

## 2018-01-01 RX ADMIN — DIGOXIN 250 MCG: 250 INJECTION, SOLUTION INTRAMUSCULAR; INTRAVENOUS; PARENTERAL at 11:29

## 2018-01-01 RX ADMIN — PANTOPRAZOLE SODIUM 40 MG: 40 TABLET, DELAYED RELEASE ORAL at 08:18

## 2018-01-01 RX ADMIN — HEPARIN SODIUM 4000 UNITS: 1000 INJECTION, SOLUTION INTRAVENOUS; SUBCUTANEOUS at 08:33

## 2018-01-01 RX ADMIN — IPRATROPIUM BROMIDE AND ALBUTEROL SULFATE 3 ML: .5; 3 SOLUTION RESPIRATORY (INHALATION) at 01:10

## 2018-01-01 RX ADMIN — METOPROLOL TARTRATE 2.5 MG: 5 INJECTION INTRAVENOUS at 11:15

## 2018-01-01 RX ADMIN — HEPARIN SODIUM AND DEXTROSE 14 UNITS/KG/HR: 10000; 5 INJECTION INTRAVENOUS at 06:26

## 2018-01-01 RX ADMIN — WATER 1 G: 1 INJECTION INTRAMUSCULAR; INTRAVENOUS; SUBCUTANEOUS at 14:22

## 2018-01-01 RX ADMIN — SODIUM CHLORIDE 0.5 MG/HR: 900 INJECTION, SOLUTION INTRAVENOUS at 05:48

## 2018-01-01 RX ADMIN — Medication 10 ML: at 14:22

## 2018-01-01 RX ADMIN — METOPROLOL TARTRATE 2.5 MG: 5 INJECTION INTRAVENOUS at 20:08

## 2018-01-01 RX ADMIN — Medication 10 ML: at 22:05

## 2018-01-01 RX ADMIN — FUROSEMIDE 40 MG: 10 INJECTION INTRAMUSCULAR; INTRAVENOUS at 08:49

## 2018-01-01 RX ADMIN — IPRATROPIUM BROMIDE AND ALBUTEROL SULFATE 3 ML: .5; 3 SOLUTION RESPIRATORY (INHALATION) at 12:10

## 2018-01-01 RX ADMIN — METOPROLOL TARTRATE 2.5 MG: 5 INJECTION INTRAVENOUS at 11:16

## 2018-01-01 RX ADMIN — PANTOPRAZOLE SODIUM 40 MG: 40 TABLET, DELAYED RELEASE ORAL at 08:02

## 2018-01-01 RX ADMIN — ATORVASTATIN CALCIUM 40 MG: 40 TABLET, FILM COATED ORAL at 21:59

## 2018-01-01 RX ADMIN — Medication 10 ML: at 05:56

## 2018-01-01 RX ADMIN — METOPROLOL TARTRATE 2.5 MG: 5 INJECTION INTRAVENOUS at 01:11

## 2018-01-01 RX ADMIN — SODIUM CHLORIDE 40 MG: 9 INJECTION, SOLUTION INTRAMUSCULAR; INTRAVENOUS; SUBCUTANEOUS at 09:09

## 2018-01-01 RX ADMIN — METOPROLOL TARTRATE 25 MG: 25 TABLET ORAL at 17:13

## 2018-01-01 RX ADMIN — FUROSEMIDE 20 MG: 40 TABLET ORAL at 11:00

## 2018-01-01 RX ADMIN — Medication 10 ML: at 13:11

## 2018-01-01 RX ADMIN — FUROSEMIDE 40 MG: 10 INJECTION, SOLUTION INTRAMUSCULAR; INTRAVENOUS at 11:38

## 2018-01-01 RX ADMIN — METOPROLOL TARTRATE 25 MG: 25 TABLET ORAL at 18:00

## 2018-01-01 RX ADMIN — DOCUSATE SODIUM 100 MG: 100 CAPSULE, LIQUID FILLED ORAL at 11:00

## 2018-01-01 RX ADMIN — METOPROLOL TARTRATE 25 MG: 25 TABLET ORAL at 23:00

## 2018-01-01 RX ADMIN — DIGOXIN 0.25 MG: 125 TABLET ORAL at 08:24

## 2018-01-01 RX ADMIN — Medication 10 ML: at 06:00

## 2018-01-01 RX ADMIN — DOCUSATE SODIUM 100 MG: 100 CAPSULE, LIQUID FILLED ORAL at 08:24

## 2018-01-01 RX ADMIN — ASPIRIN 81 MG: 81 TABLET, COATED ORAL at 10:11

## 2018-01-16 NOTE — ED PROVIDER NOTES
HPI Comments: 82 yo M with h/o HTN presents for evaluation after ground level fall at home. States he was walking and lost his balance and fell, hitting his head on the floor. Only complaint is mild right shoulder pain. Wife is at bedside and was in another room when pt fell and she helped him off the floor. Per both pt and wife pt has frequent falls d/t being unsteady on his feet. Acting at baseline per wife. Denies LOC, HA, neck pain, back pain, n/v, dizziness. No other complaints. Patient is a 81 y.o. male presenting with fall. Fall   Pertinent negatives include no abdominal pain and no headaches. 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 No pertinent surgical history. History reviewed. No pertinent family history. Social History     Social History    Marital status:      Spouse name: N/A    Number of children: N/A    Years of education: N/A     Occupational History    Not on file. Social History Main Topics    Smoking status: Never Smoker    Smokeless tobacco: Never Used    Alcohol use No    Drug use: No    Sexual activity: Not on file     Other Topics Concern    Not on file     Social History Narrative         ALLERGIES: Review of patient's allergies indicates no known allergies. Review of Systems   Constitutional: Negative for activity change. Respiratory: Negative for shortness of breath. Cardiovascular: Negative for chest pain. Gastrointestinal: Negative for abdominal pain. Musculoskeletal: Positive for arthralgias (right shoulder pain). Negative for back pain and neck pain. Neurological: Negative for dizziness, syncope and headaches. All other systems reviewed and are negative.       Vitals:    01/15/18 1812 01/15/18 1813 01/15/18 1814 01/15/18 1815   BP:       Pulse: 93 93 93 93   Resp: 18 23 19 20   Temp:       SpO2: 99% 99% 99% 99%   Weight:       Height: Physical Exam   Constitutional: He is oriented to person, place, and time. He appears well-developed and well-nourished. No distress. HENT:   Head: Normocephalic. Superficial contusion to posterior scalp. Eyes: Conjunctivae are normal.   Neck: Normal range of motion. Neck supple. No spinous process tenderness and no muscular tenderness present. Cardiovascular: Normal rate, regular rhythm and normal heart sounds. Pulmonary/Chest: Effort normal and breath sounds normal. No respiratory distress. He has no wheezes. He has no rales. Abdominal: Soft. He exhibits no distension. There is no tenderness. There is no rebound and no guarding. Musculoskeletal:   Right shoulder with mild diffuse TTP, no obvious contusion or deformity. No pain in right elbow or wrist. Radial pulse 2+. Neurological: He is alert and oriented to person, place, and time. No cranial nerve deficit. He exhibits normal muscle tone. Coordination normal.   Ambulating in ED without difficulty. Skin: Skin is warm and dry. Psychiatric: He has a normal mood and affect. His behavior is normal. Judgment and thought content normal.   Nursing note and vitals reviewed. MDM  Number of Diagnoses or Management Options  Closed head injury, initial encounter:   Fall, initial encounter:     ED Course       Procedures    -------------------------------------------------------------------------------------------------------------------     EKG INTERPRETATIONS:      RADIOLOGY RESULTS:   CT HEAD WO CONT    (Results Pending)   CT SPINE CERV WO CONT    (Results Pending)   XR SHOULDER RT AP/LAT MIN 2 V    (Results Pending)       LABORATORY RESULTS:  No results found for this or any previous visit (from the past 12 hour(s)). CONSULTATIONS:        PROGRESS NOTES:    8:30 PM Pt well appearing and in NAD. Ambulating in ED without difficulty. Nothing acute on imaging. Will d/h to f/u with PCP for further eval as needed.      Lengthy D/W pt regarding possible worsening of pt's condition, need for follow up and strict ED return instructions for any worsening symptoms. DISPOSITION:  ED DIAGNOSIS & DISPOSITION INFORMATION  Diagnosis:   1. Closed head injury, initial encounter    2. Fall, initial encounter          Disposition: home    Follow-up Information     Follow up With Details Comments Contact Info    [facility] EMERGENCY DEPT  Immediately if symptoms worsen [address]  [phone]          Patient's Medications   Start Taking    No medications on file   Continue Taking    ALBUTEROL (PROVENTIL HFA, VENTOLIN HFA, PROAIR HFA) 90 MCG/ACTUATION INHALER    Take 2 Puffs by inhalation every four (4) hours as needed for Wheezing. ASCORBIC ACID (VITAMIN C) 500 MG TABLET    Take  by mouth. ASPIRIN DELAYED-RELEASE 81 MG TABLET    Take 81 mg by mouth daily. CHOLECALCIFEROL, VITAMIN D3, (VITAMIN D3) 2,000 UNIT TAB    Take 2,000 Units by mouth. GABAPENTIN (NEURONTIN) 300 MG CAPSULE    Take 300 mg by mouth three (3) times daily. HYDROCHLOROTHIAZIDE (HYDRODIURIL) 25 MG TABLET    Take 25 mg by mouth daily. HYDROCODONE-ACETAMINOPHEN (NORCO) 5-325 MG PER TABLET    Take 1-2 tablets PO every 4-6 hours as needed for pain control. If over the counter ibuprofen or acetaminophen was suggested, then only take the vicodin for pain not well controlled with the over the counter medication. IBUPROFEN (MOTRIN) 600 MG TABLET    Take 1 Tab by mouth every six (6) hours as needed for Pain. LANSOPRAZOLE (PREVACID) 30 MG CAPSULE    Take 30 mg by mouth Daily (before breakfast). LISINOPRIL (PRINIVIL, ZESTRIL) 5 MG TABLET    Take  by mouth daily. OMEPRAZOLE (PRILOSEC) 20 MG CAPSULE    Take 20 mg by mouth daily. ONDANSETRON (ZOFRAN ODT) 4 MG DISINTEGRATING TABLET    Take 4 mg by mouth every eight (8) hours as needed for Nausea. SIMVASTATIN (ZOCOR) 40 MG TABLET    Take 40 mg by mouth nightly.      These Medications have changed    No medications on file   Stop Taking    No medications on file

## 2018-01-16 NOTE — DISCHARGE INSTRUCTIONS

## 2018-01-16 NOTE — ED NOTES
Pt discharged to home ambulatory and in company of wife  Discharge instructions provided via discussion and handout. Teaching to wife and patient. Verbalized understanding. No questions voiced. Discharged with 0 RX.

## 2018-02-13 NOTE — ED TRIAGE NOTES
Pt states he became SOB and nauseated  while eating a cupcake in a cupcake eating contest.  Pt states the symptoms have subsided.

## 2018-02-14 NOTE — ED NOTES
Bedside report received from [facility], [address] [facility]  Pt resting on the stretcher in NAD. Pertinent information reviewed including results, medication administration, and SBAR. Any questions pt presents answered. Any needs addressed.

## 2018-02-14 NOTE — ED PROVIDER NOTES
HPI Comments: Nereida Hewitt is a 81 y.o. Male who sent over from nursing facility after he started choking on cupcake he was eating and got sob which all resolved within few seconds. Occurred about 2 hours ago. No sx now. No diff swallowing, sob, coughing, cp now. No prior sx to event. Chokes sometimes when he eats too fast. No intervention occurred or treatment prior to arrival    The history is provided by the patient. 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 No family history on file. Social History     Social History    Marital status:      Spouse name: N/A    Number of children: N/A    Years of education: N/A     Occupational History    Not on file. Social History Main Topics    Smoking status: Never Smoker    Smokeless tobacco: Never Used    Alcohol use No    Drug use: No    Sexual activity: Not on file     Other Topics Concern    Not on file     Social History Narrative         ALLERGIES: Review of patient's allergies indicates no known allergies. Review of Systems   Constitutional: Negative for fever. HENT: Negative for trouble swallowing. Eyes: Negative for visual disturbance. Respiratory: Negative for cough. Cardiovascular: Negative for chest pain and leg swelling. Gastrointestinal: Negative for abdominal pain, diarrhea and vomiting. Endocrine: Negative for polyuria. Genitourinary: Negative for difficulty urinating. Musculoskeletal: Negative for joint swelling. Skin: Negative for wound. Allergic/Immunologic: Negative for immunocompromised state. Neurological: Negative for syncope. Psychiatric/Behavioral: Negative for sleep disturbance. Vitals:    02/13/18 1828   BP: 132/67   Pulse: 78   Resp: 22   Temp: 97.9 °F (36.6 °C)   SpO2: 100%            Physical Exam   Constitutional: He is oriented to person, place, and time. Non-toxic appearance.  He does not appear ill. No distress. HENT:   Head: Normocephalic and atraumatic. Right Ear: External ear normal.   Left Ear: External ear normal.   Nose: Nose normal.   Mouth/Throat: Oropharynx is clear and moist. No oropharyngeal exudate. Eyes: Conjunctivae are normal.   Neck: Normal range of motion. Cardiovascular: Normal rate, regular rhythm, normal heart sounds and intact distal pulses. Pulmonary/Chest: Effort normal and breath sounds normal. No stridor. No respiratory distress. He has no wheezes. He has no rales. Abdominal: Soft. There is no tenderness. Musculoskeletal: Normal range of motion. He exhibits no edema. Neurological: He is alert and oriented to person, place, and time. Skin: Skin is warm and dry. He is not diaphoretic. Psychiatric: His behavior is normal.   Nursing note and vitals reviewed.        [facility]      ED Course       Procedures   Vitals:  Patient Vitals for the past 12 hrs:   Temp Pulse Resp BP SpO2   02/13/18 1828 97.9 °F (36.6 °C) 78 22 132/67 100 %         Medications ordered:   Medications - No data to display      Lab findings:  Recent Results (from the past 12 hour(s))   EKG, 12 LEAD, INITIAL    Collection Time: 02/13/18  6:45 PM   Result Value Ref Range    Ventricular Rate 77 BPM    Atrial Rate 77 BPM    P-R Interval 182 ms    QRS Duration 92 ms    Q-T Interval 358 ms    QTC Calculation (Bezet) 405 ms    Calculated P Axis 33 degrees    Calculated R Axis -7 degrees    Calculated T Axis 17 degrees    Diagnosis       Normal sinus rhythm  Possible Left atrial enlargement  Nonspecific T wave abnormality  Abnormal ECG  When compared with ECG of 05-MAR-2015 22:10,  Nonspecific T wave abnormality now evident in Anterolateral leads         EKG interpretation by ED Physician:  nsr with ns tw changes  Rate 77, pr 182, qtc 405  Ns tw changes now present c/w previous but no acute ischemia    X-Ray, CT or other radiology findings or impressions:  XR CHEST PA LAT    (Results Pending) Nothing acute  Progress notes, Consult notes or additional Procedure notes:   Pt was able to drink water here without diff. Doubt need for any labs, other testing. All related to eating too fast. '  I have discussed with patient and/or family/sig other the results, interpretation of any imaging if performed, suspected diagnosis and treatment plan to include instructions regarding the diagnoses listed to which understanding was expressed with all questions answered      Reevaluation of patient:   Stable for dc    Disposition:  Diagnosis:   1. Choking episode        Disposition: home      Follow-up Information     Follow up With Details Comments Contact Info    [facility] EMERGENCY DEPT  If symptoms worsen [address]            Patient's Medications   Start Taking    No medications on file   Continue Taking    ALBUTEROL (PROVENTIL HFA, VENTOLIN HFA, PROAIR HFA) 90 MCG/ACTUATION INHALER    Take 2 Puffs by inhalation every four (4) hours as needed for Wheezing. ASCORBIC ACID (VITAMIN C) 500 MG TABLET    Take  by mouth. ASPIRIN DELAYED-RELEASE 81 MG TABLET    Take 81 mg by mouth daily. CHOLECALCIFEROL, VITAMIN D3, (VITAMIN D3) 2,000 UNIT TAB    Take 2,000 Units by mouth. GABAPENTIN (NEURONTIN) 300 MG CAPSULE    Take 300 mg by mouth three (3) times daily. HYDROCHLOROTHIAZIDE (HYDRODIURIL) 25 MG TABLET    Take 25 mg by mouth daily. HYDROCODONE-ACETAMINOPHEN (NORCO) 5-325 MG PER TABLET    Take 1-2 tablets PO every 4-6 hours as needed for pain control. If over the counter ibuprofen or acetaminophen was suggested, then only take the vicodin for pain not well controlled with the over the counter medication. IBUPROFEN (MOTRIN) 600 MG TABLET    Take 1 Tab by mouth every six (6) hours as needed for Pain. LANSOPRAZOLE (PREVACID) 30 MG CAPSULE    Take 30 mg by mouth Daily (before breakfast). LISINOPRIL (PRINIVIL, ZESTRIL) 5 MG TABLET    Take  by mouth daily. OMEPRAZOLE (PRILOSEC) 20 MG CAPSULE    Take 20 mg by mouth daily. ONDANSETRON (ZOFRAN ODT) 4 MG DISINTEGRATING TABLET    Take 4 mg by mouth every eight (8) hours as needed for Nausea. SIMVASTATIN (ZOCOR) 40 MG TABLET    Take 40 mg by mouth nightly.      These Medications have changed    No medications on file   Stop Taking    No medications on file

## 2018-02-20 PROBLEM — R06.3 CENTRAL SLEEP APNEA DUE TO CHEYNE-STOKES RESPIRATION: Status: ACTIVE | Noted: 2018-01-01

## 2018-02-20 PROBLEM — R06.3 CHEYNE-STOKES BREATHING: Status: ACTIVE | Noted: 2018-01-01

## 2018-02-20 PROBLEM — N30.00 ACUTE CYSTITIS: Status: ACTIVE | Noted: 2018-01-01

## 2018-02-20 PROBLEM — J81.1 PULMONARY EDEMA: Status: ACTIVE | Noted: 2018-01-01

## 2018-02-20 PROBLEM — I21.4 NSTEMI (NON-ST ELEVATED MYOCARDIAL INFARCTION) (HCC): Status: ACTIVE | Noted: 2018-01-01

## 2018-02-20 PROBLEM — I50.23 HEART FAILURE, SYSTOLIC, WITH ACUTE DECOMPENSATION (HCC): Status: ACTIVE | Noted: 2018-01-01

## 2018-02-20 PROBLEM — I50.21 ACUTE SYSTOLIC HEART FAILURE (HCC): Status: ACTIVE | Noted: 2018-01-01

## 2018-02-20 NOTE — PROGRESS NOTES
1000- Assumed care of pt from ED. Placed on monitor. HR 120s, RR 20-30s. Pt a/ox4, IVAN, FC. Lungs diminished without wheeze on 2L. Mild SOB noted. Pt has no complaints of chest pain or discomfort. EKG completed- Sinus tachycardia with occasional premature ventricular complexes   Possible Left atrial enlargement   Possible Anterior infarct , age undetermined   Abnormal ECG    1125- Pt placed on BiPAP for increased work of breathing, RR 40s, SOB    1231- Critical troponin (25.7) called to RN, Dr. Talia Ludwig notified. Orders received    [id number]- Dr. Talia Ludwig at bedside, req to take pt of bipap and place on 2L NC as pt has cheyne morgan respirations     1506-  Critical troponin (36.0) called to RNDr. Talia notified. Orders received    1059- EKG completed- Sinus tachycardia   Possible Left atrial enlargement   ST & T wave abnormality, consider lateral ischemia   Abnormal ECG     1915- Bedside and Verbal shift change report given to [address] (oncoming nurse) by Osmar García. Marck Chaves RN  (offgoing nurse). Report included the following information SBAR, Kardex, ED Summary, Intake/Output, MAR, Recent Results and Cardiac Rhythm ST with PVCs.

## 2018-02-20 NOTE — PROGRESS NOTES
Took over care from Dr. Alvaro Jo at 0892. Pt presented with SOB, sepsis bundle initiate by previous provider. Pt with tachycardia into 120's and tachypnea on my exam with clear lungs and patchy bilateral infiltrates on CXR concerning for pulmonary edema and infection. BNP and troponin pending with ECG showing ST depressions concerning for ischemia. Pt report SOB starting yesterday and n/v this morning. ROS positive for mild chest pain. Pt is a poor historian.   7:34 AM troponin just resulted at 10. Getting repeat ECG, trop paging cardiology and starting Heparin for NSTEMI. Will eval at bedside echo and for pericardial effusion. Poor systolic squeeze on bedside echo. Pt admitted to ICU.      ED Course   Comment By Time   Faintly positive hemoccult blood in stool Tracy Dsouza DO 02/20 9013

## 2018-02-20 NOTE — ED PROVIDER NOTES
EMERGENCY DEPARTMENT HISTORY AND PHYSICAL EXAM    5:38 AM      Date: 2/20/2018  Patient Name: Sascha Hatch    History of Presenting Illness     Chief Complaint   Patient presents with    Shortness of Breath         History Provided By: Patient    Chief Complaint: SOB  Duration:  Several hours  Timing:  acute on chronic  Severity: Severe  Modifying Factors: none  Associated Symptoms: denies any other associated signs or symptoms      Additional History (Context): Sascha Hatch, a 81 y.o. Male, nonsmoker, non-alcohol drinker, with hx of HTN, asbestosis and asthma, not on O2 at home, presents to the ED via EMS for acute on chronic SOB x several hours. Pt denies fever. Hx is limited by pt's respiratory distress. PCP: None    Current Facility-Administered Medications   Medication Dose Route Frequency Provider Last Rate Last Dose    sodium chloride (NS) flush 5-10 mL  5-10 mL IntraVENous PRN Wali Fowler MD        vancomycin (VANCOCIN) 1000 mg in  ml infusion  1,000 mg IntraVENous ONCE Wali Fowler MD        levoFLOXacin (LEVAQUIN) 750 mg in D5W IVPB  750 mg IntraVENous Q24H Wali Fowler MD        piperacillin-tazobactam (ZOSYN) 4.5 g in 0.9% sodium chloride (MBP/ADV) 100 mL MBP  4.5 g IntraVENous Q6H Wali Fowler MD         Current Outpatient Prescriptions   Medication Sig Dispense Refill    ergocalciferol (VITAMIN D2) 50,000 unit capsule Take 50,000 Units by mouth every seven (7) days.  acetaminophen (TYLENOL EXTRA STRENGTH) 500 mg tablet Take 500 mg by mouth every six (6) hours as needed for Pain.  atorvastatin (LIPITOR) 10 mg tablet Take 10 mg by mouth daily.  aspirin delayed-release 81 mg tablet Take 81 mg by mouth daily.  ondansetron (ZOFRAN ODT) 4 mg disintegrating tablet Take 4 mg by mouth every eight (8) hours as needed for Nausea.  cholecalciferol, vitamin D3, (VITAMIN D3) 2,000 unit tab Take 2,000 Units by mouth.       albuterol (PROVENTIL HFA, VENTOLIN HFA, PROAIR HFA) 90 mcg/actuation inhaler Take 2 Puffs by inhalation every four (4) hours as needed for Wheezing. 1 Inhaler 0    lisinopril (PRINIVIL, ZESTRIL) 5 mg tablet Take  by mouth daily.  omeprazole (PRILOSEC) 20 mg capsule Take 20 mg by mouth daily.  gabapentin (NEURONTIN) 300 mg capsule Take 300 mg by mouth three (3) times daily.  ascorbic acid (VITAMIN C) 500 mg tablet Take  by mouth.  HYDROcodone-acetaminophen (NORCO) 5-325 mg per tablet Take 1-2 tablets PO every 4-6 hours as needed for pain control. If over the counter ibuprofen or acetaminophen was suggested, then only take the vicodin for pain not well controlled with the over the counter medication. 12 Tab 0    ibuprofen (MOTRIN) 600 mg tablet Take 1 Tab by mouth every six (6) hours as needed for Pain. 20 Tab 0    lansoprazole (PREVACID) 30 mg capsule Take 30 mg by mouth Daily (before breakfast).  hydrochlorothiazide (HYDRODIURIL) 25 mg tablet Take 25 mg by mouth daily.  simvastatin (ZOCOR) 40 mg tablet Take 40 mg by mouth nightly. Past History     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)     Asthma     Chronic obstructive pulmonary disease ([facility] [address]     Hypertension        Past Surgical History:  History reviewed. No pertinent surgical history. Family History:  History reviewed. No pertinent family history. Social History:  Social History   Substance Use Topics    Smoking status: Never Smoker    Smokeless tobacco: Never Used    Alcohol use No       Allergies:  No Known Allergies      Review of Systems     Review of Systems   Unable to perform ROS: Acuity of condition       Physical Exam     Visit Vitals    /48    Pulse (!) 123    Temp 96.7 °F (35.9 °C)    Resp 30    Ht 5' 6\" (1.676 m)    Wt 68 kg (150 lb)    SpO2 93%    BMI 24.21 kg/m2       Physical Exam   Constitutional: He is oriented to person, place, and time.  He appears well-developed. No distress. Chronically illappearing, mild respiratory distress   HENT:   Head: Normocephalic and atraumatic. Eyes: EOM are normal.   Disconjugate gaze at baseline   Neck: Normal range of motion. Cardiovascular: Tachycardia present. Pulmonary/Chest: He is in respiratory distress (mild). He has wheezes. Abdominal: Soft. There is no tenderness. Musculoskeletal: Normal range of motion. He exhibits no edema. Mechanically stable   Neurological: He is alert and oriented to person, place, and time. No focal deficits noted   Psychiatric: His behavior is normal.   Nursing note and vitals reviewed. Diagnostic Study Results     Labs -  Recent Results (from the past 12 hour(s))   METABOLIC PANEL, COMPREHENSIVE    Collection Time: 02/20/18  6:04 AM   Result Value Ref Range    Sodium 148 (H) 136 - 145 mmol/L    Potassium 3.9 3.5 - 5.5 mmol/L    Chloride 115 (H) 100 - 108 mmol/L    CO2 21 21 - 32 mmol/L    Anion gap 12 3.0 - 18 mmol/L    Glucose 111 (H) 74 - 99 mg/dL    BUN 34 (H) 7.0 - 18 MG/DL    Creatinine 1.15 0.6 - 1.3 MG/DL    BUN/Creatinine ratio 30 (H) 12 - 20      GFR est AA >60 >60 ml/min/1.73m2    GFR est non-AA >60 >60 ml/min/1.73m2    Calcium 9.1 8.5 - 10.1 MG/DL    Bilirubin, total 0.6 0.2 - 1.0 MG/DL    ALT (SGPT) 52 16 - 61 U/L    AST (SGOT) 168 (H) 15 - 37 U/L    Alk.  phosphatase 84 45 - 117 U/L    Protein, total 7.7 6.4 - 8.2 g/dL    Albumin 3.5 3.4 - 5.0 g/dL    Globulin 4.2 (H) 2.0 - 4.0 g/dL    A-G Ratio 0.8 0.8 - 1.7     CBC WITH AUTOMATED DIFF    Collection Time: 02/20/18  6:07 AM   Result Value Ref Range    WBC 8.1 4.6 - 13.2 K/uL    RBC 4.13 (L) 4.70 - 5.50 M/uL    HGB 12.2 (L) 13.0 - 16.0 g/dL    HCT 36.7 36.0 - 48.0 %    MCV 88.9 74.0 - 97.0 FL    MCH 29.5 24.0 - 34.0 PG    MCHC 33.2 31.0 - 37.0 g/dL    RDW 14.9 (H) 11.6 - 14.5 %    PLATELET 118 454 - 267 K/uL    MPV 11.0 9.2 - 11.8 FL    NEUTROPHILS 87 (H) 40 - 73 %    LYMPHOCYTES 9 (L) 21 - 52 %    MONOCYTES 4 3 - 10 %    EOSINOPHILS 0 0 - 5 %    BASOPHILS 0 0 - 2 %    ABS. NEUTROPHILS 7.0 1.8 - 8.0 K/UL    ABS. LYMPHOCYTES 0.7 (L) 0.9 - 3.6 K/UL    ABS. MONOCYTES 0.4 0.05 - 1.2 K/UL    ABS. EOSINOPHILS 0.0 0.0 - 0.4 K/UL    ABS. BASOPHILS 0.0 0.0 - 0.06 K/UL    DF AUTOMATED     POC LACTIC ACID    Collection Time: 02/20/18  6:26 AM   Result Value Ref Range    Lactic Acid (POC) 2.4 (HH) 0.4 - 2.0 mmol/L   EKG, 12 LEAD, INITIAL    Collection Time: 02/20/18  6:40 AM   Result Value Ref Range    Ventricular Rate 122 BPM    Atrial Rate 122 BPM    P-R Interval 160 ms    QRS Duration 104 ms    Q-T Interval 328 ms    QTC Calculation (Bezet) 467 ms    Calculated P Axis 55 degrees    Calculated R Axis 18 degrees    Calculated T Axis -34 degrees    Diagnosis       Sinus tachycardia with premature atrial complexes  Cannot rule out Inferior infarct , age undetermined  Abnormal ECG  When compared with ECG of 13-FEB-2018 18:45,  premature atrial complexes are now present  Vent. rate has increased BY  45 BPM  Minimal criteria for Inferior infarct are now present  ST now depressed in Lateral leads  Nonspecific T wave abnormality no longer evident in Lateral leads         Radiologic Studies -   XR CHEST PORT    (Results Pending)         Medical Decision Making   I am the first provider for this patient. I reviewed the vital signs, available nursing notes, past medical history, past surgical history, family history and social history. Vital Signs-Reviewed the patient's vital signs. Pulse Oximetry Analysis - 93% on 2 L NC (Interpretation) Non-Hypoxic on O2    Records Reviewed: Nursing Notes and Old Medical Records (Time of Review: 5:38 AM)    ED Course: Progress Notes, Reevaluation, and Consults:    Provider Notes (Medical Decision Making): MDM  Number of Diagnoses or Management Options  SOB (shortness of breath):   Diagnosis management comments: 80 yo CM with PMHx asthma presents by EMS with shortness of breath.   History otherwise limited. Examination significant for tachycardia, bilateral wheezing with some increased wob noted. Consider asthma exacerbation, will evaluate for pneumonia, acute process. 6:49 AM  cxr with bilateral infiltrate vs edema. Lactic elevated but wbc ok. Will start abx empirically. ekg with non-specific changes. Trop and bnp pending. Care to be transferred to Dr. Lissette Quintero at end of shift, pending results and clinical re-evaluation, expect likely admission. Amount and/or Complexity of Data Reviewed  Clinical lab tests: ordered and reviewed  Tests in the radiology section of CPT®: ordered and reviewed  Obtain history from someone other than the patient: yes  Review and summarize past medical records: yes  Independent visualization of images, tracings, or specimens: yes (Bilateral infiltrate)    Patient Progress  Patient progress: stable      Procedures: EKG  Date/Time: 2/20/2018 6:45 AM  Performed by: Cherelle Clemente  Authorized by: Cherelle Clemente     ECG reviewed by ED Physician in the absence of a cardiologist: yes    Previous ECG:     Previous ECG:  Compared to current    Comparison ECG info:  Non-specific changes from 2/20/18    Similarity:  Changes noted  Interpretation:     Interpretation: non-specific    Rate:     ECG rate:  122    ECG rate assessment: tachycardic    Rhythm:     Rhythm: sinus tachycardia    Ectopy:     Ectopy: none    QRS:     QRS axis:  Normal    QRS intervals:  Normal  Conduction:     Conduction: normal    ST segments:     ST segments:  Non-specific  T waves:     T waves: non-specific          Diagnosis     Clinical Impression:   1. SOB (shortness of breath)        Disposition: Pending    Follow-up Information     None           Patient's Medications   Start Taking    No medications on file   Continue Taking    ACETAMINOPHEN (TYLENOL EXTRA STRENGTH) 500 MG TABLET    Take 500 mg by mouth every six (6) hours as needed for Pain.     ALBUTEROL (PROVENTIL HFA, VENTOLIN HFA, PROAIR HFA) 90 MCG/ACTUATION INHALER    Take 2 Puffs by inhalation every four (4) hours as needed for Wheezing. ASCORBIC ACID (VITAMIN C) 500 MG TABLET    Take  by mouth. ASPIRIN DELAYED-RELEASE 81 MG TABLET    Take 81 mg by mouth daily. ATORVASTATIN (LIPITOR) 10 MG TABLET    Take 10 mg by mouth daily. CHOLECALCIFEROL, VITAMIN D3, (VITAMIN D3) 2,000 UNIT TAB    Take 2,000 Units by mouth. ERGOCALCIFEROL (VITAMIN D2) 50,000 UNIT CAPSULE    Take 50,000 Units by mouth every seven (7) days. GABAPENTIN (NEURONTIN) 300 MG CAPSULE    Take 300 mg by mouth three (3) times daily. HYDROCHLOROTHIAZIDE (HYDRODIURIL) 25 MG TABLET    Take 25 mg by mouth daily. HYDROCODONE-ACETAMINOPHEN (NORCO) 5-325 MG PER TABLET    Take 1-2 tablets PO every 4-6 hours as needed for pain control. If over the counter ibuprofen or acetaminophen was suggested, then only take the vicodin for pain not well controlled with the over the counter medication. IBUPROFEN (MOTRIN) 600 MG TABLET    Take 1 Tab by mouth every six (6) hours as needed for Pain. LANSOPRAZOLE (PREVACID) 30 MG CAPSULE    Take 30 mg by mouth Daily (before breakfast). LISINOPRIL (PRINIVIL, ZESTRIL) 5 MG TABLET    Take  by mouth daily. OMEPRAZOLE (PRILOSEC) 20 MG CAPSULE    Take 20 mg by mouth daily. ONDANSETRON (ZOFRAN ODT) 4 MG DISINTEGRATING TABLET    Take 4 mg by mouth every eight (8) hours as needed for Nausea. SIMVASTATIN (ZOCOR) 40 MG TABLET    Take 40 mg by mouth nightly.      These Medications have changed    No medications on file   Stop Taking    No medications on file     _______________________________    Attestations:  Danielle Rodriguez acting as a scribe for and in the presence of Kiarra Amaral MD      February 20, 2018 at 5:38 AM       Provider Attestation:      I personally performed the services described in the documentation, reviewed the documentation, as recorded by the scribe in my presence, and it accurately and completely records my words and actions. February 20, 2018 at 5:38 AM - Filiberto Willson MD    _______________________________    6:39 AM : Pt care transferred to Dr. Mishel Donahue  ,ED provider. History of patient complaint(s), available diagnostic reports and current treatment plan has been discussed thoroughly. Bedside rounding on patient occured : yes .   Intended disposition of patient : TBD  Pending diagnostics reports and/or labs (please list): labs and disposition

## 2018-02-20 NOTE — PROGRESS NOTES
attempted to complete  the initial Spiritual Assessment of the patient in bed 6 of the emergency room  and offer Pastoral Care support. .  Patient however was resting peacefully while under going a breathing treatment on the By Pap machine. No family seen at time of this visit. Patient does not have any Jewish/cultural needs that will affect patients preferences in health care.  Chaplains will continue to follow and will provide pastoral care on an as needed/requested basis     Chaplain Juan Dawson   Board Certified [address] [place] [address]   [phone]

## 2018-02-20 NOTE — H&P
Hospitalist Admission Note    NAME:  Kierra Friedman   :   1935   MRN:   [id number]     Date/Time:  2018 1:17 PM    Subjective:     CHIEF COMPLAINT:    Chief Complaint   Patient presents with    Shortness of Breath       HISTORY OF PRESENT ILLNESS:     Chay Munson is a 81 y.o.  male with h/o asthma,copd,was to the emergency room because of short of breath. Patient is a poor historian and no family member at the time of my evaluation,so the history is mostly obtained from the ER attending. It is reported that was brought to the emergency room because of short of breath of several hours. Initially started on iv fluid and antibiotics for possible sepsis but the ER doctor who initially saw patient. When the replacing ER arrived and reviewed the cxr and lab results,it was noted that patient elevated troponin at 10.20 and CXR showed pulmonary edema. Lasix iv was given and cardiology was consulted for NSTEMI. While talking to patient,he has reported having some chest pain but was not specific. Past Medical History:   Diagnosis Date    Asbestosis(501)     Asthma     Chronic obstructive pulmonary disease ([facility] [address]     Hypertension         Social History   Substance Use Topics    Smoking status: Never Smoker    Smokeless tobacco: Never Used    Alcohol use No        History reviewed. No pertinent family history. No Known Allergies     Prior to Admission medications    Medication Sig Start Date End Date Taking? Authorizing Provider   ergocalciferol (VITAMIN D2) 50,000 unit capsule Take 50,000 Units by mouth every seven (7) days. Yes Charlee Holley MD   acetaminophen (TYLENOL EXTRA STRENGTH) 500 mg tablet Take 500 mg by mouth every six (6) hours as needed for Pain. Yes Charlee Holley MD   atorvastatin (LIPITOR) 10 mg tablet Take 10 mg by mouth daily. Yes Charlee Holley MD   aspirin delayed-release 81 mg tablet Take 81 mg by mouth daily.      Yes Charlee Holley MD   ondansetron (ZOFRAN ODT) 4 mg disintegrating tablet Take 4 mg by mouth every eight (8) hours as needed for Nausea. Charlee Holley MD   cholecalciferol, vitamin D3, (VITAMIN D3) 2,000 unit tab Take 2,000 Units by mouth. Charlee Holley MD   albuterol (PROVENTIL HFA, VENTOLIN HFA, PROAIR HFA) 90 mcg/actuation inhaler Take 2 Puffs by inhalation every four (4) hours as needed for Wheezing. 3/5/15   Karin Gómez MD   lisinopril (PRINIVIL, ZESTRIL) 5 mg tablet Take  by mouth daily. Charlee Holley MD   omeprazole (PRILOSEC) 20 mg capsule Take 20 mg by mouth daily. Charlee Holley MD   gabapentin (NEURONTIN) 300 mg capsule Take 300 mg by mouth three (3) times daily. Charlee Holley MD   ascorbic acid (VITAMIN C) 500 mg tablet Take  by mouth. Charlee Holley MD   HYDROcodone-acetaminophen (NORCO) 5-325 mg per tablet Take 1-2 tablets PO every 4-6 hours as needed for pain control. If over the counter ibuprofen or acetaminophen was suggested, then only take the vicodin for pain not well controlled with the over the counter medication. 7/27/14   RASHAAD Henderson   ibuprofen (MOTRIN) 600 mg tablet Take 1 Tab by mouth every six (6) hours as needed for Pain. 7/27/14   RASHAAD Henderson   lansoprazole (PREVACID) 30 mg capsule Take 30 mg by mouth Daily (before breakfast). Charlee Holley MD   hydrochlorothiazide (HYDRODIURIL) 25 mg tablet Take 25 mg by mouth daily. Charlee Holley MD   simvastatin (ZOCOR) 40 mg tablet Take 40 mg by mouth nightly. Charlee Holley MD       REVIEW OF SYSTEMS:    Constitutional:  No fever or weight loss  HEENT:  No headache or visual changes  Cardiovascular:  Chest pain,short of breath  Respiratory:Short of breath  GI:  No abdominal pain. No nausea or vomiting. No diarrhea  :  No hematuria or dysuria. No frequency, retention, urinary incontinence.   Skin:  No rashes or moles  Neuro:  No seizures or syncope  Hematological:  No bruising or bleeding  Endocrine:  No diabetes or thyroid disease  Objective:   VITALS:       Visit Vitals    /70    Pulse (!) 118    Temp 97.7 °F (36.5 °C)    Resp (!) 32    Ht 5' 6\" (1.676 m)    Wt 67.2 kg (148 lb 2.4 oz)    SpO2 97%    BMI 23.91 kg/m2     O2 Flow Rate (L/min): 2 l/min O2 Device: BIPAP    PHYSICAL EXAM:   General:    Lying in bed in mild distress. HEENT:  Pupils equal.  Sclera anicteric. Conjunctiva pink. Mucous membranes                           moist  Neck:  Supple. Trachea midline. No accessory muscle use. No thyromegaly. No jugular venous distention  CV:                  Regular rate and rhythm. Lungs:   Rales in the bases. Abdomen:   Soft, non-tender. Not distended. Bowel sounds normal. No organomegaly  Extremities: No cyanosis. 2+edema. No clubbing  Neurologic: Alert and oriented X 3. Skin:                Warm and dry. No rashes. LAB DATA REVIEWED:    Recent Results (from the past 24 hour(s))   METABOLIC PANEL, COMPREHENSIVE    Collection Time: 02/20/18  6:04 AM   Result Value Ref Range    Sodium 148 (H) 136 - 145 mmol/L    Potassium 3.9 3.5 - 5.5 mmol/L    Chloride 115 (H) 100 - 108 mmol/L    CO2 21 21 - 32 mmol/L    Anion gap 12 3.0 - 18 mmol/L    Glucose 111 (H) 74 - 99 mg/dL    BUN 34 (H) 7.0 - 18 MG/DL    Creatinine 1.15 0.6 - 1.3 MG/DL    BUN/Creatinine ratio 30 (H) 12 - 20      GFR est AA >60 >60 ml/min/1.73m2    GFR est non-AA >60 >60 ml/min/1.73m2    Calcium 9.1 8.5 - 10.1 MG/DL    Bilirubin, total 0.6 0.2 - 1.0 MG/DL    ALT (SGPT) 52 16 - 61 U/L    AST (SGOT) 168 (H) 15 - 37 U/L    Alk.  phosphatase 84 45 - 117 U/L    Protein, total 7.7 6.4 - 8.2 g/dL    Albumin 3.5 3.4 - 5.0 g/dL    Globulin 4.2 (H) 2.0 - 4.0 g/dL    A-G Ratio 0.8 0.8 - 1.7     TROPONIN I    Collection Time: 02/20/18  6:04 AM   Result Value Ref Range    Troponin-I, Qt. 10.20 (HH) 0.0 - 0.045 NG/ML   NT-PRO BNP    Collection Time: 02/20/18  6:04 AM   Result Value Ref Range    NT pro-BNP 7699 (H) 0 - 1800 PG/ML   TSH 3RD GENERATION    Collection Time: 02/20/18  6:04 AM   Result Value Ref Range    TSH 1.41 0.36 - 3.74 uIU/mL   T4, FREE    Collection Time: 02/20/18  6:04 AM   Result Value Ref Range    T4, Free 1.4 0.7 - 1.5 NG/DL   HEMOGLOBIN A1C W/O EAG    Collection Time: 02/20/18  6:04 AM   Result Value Ref Range    Hemoglobin A1c 5.6 4.2 - 5.6 %   CBC WITH AUTOMATED DIFF    Collection Time: 02/20/18  6:07 AM   Result Value Ref Range    WBC 8.1 4.6 - 13.2 K/uL    RBC 4.13 (L) 4.70 - 5.50 M/uL    HGB 12.2 (L) 13.0 - 16.0 g/dL    HCT 36.7 36.0 - 48.0 %    MCV 88.9 74.0 - 97.0 FL    MCH 29.5 24.0 - 34.0 PG    MCHC 33.2 31.0 - 37.0 g/dL    RDW 14.9 (H) 11.6 - 14.5 %    PLATELET 504 366 - 287 K/uL    MPV 11.0 9.2 - 11.8 FL    NEUTROPHILS 87 (H) 40 - 73 %    LYMPHOCYTES 9 (L) 21 - 52 %    MONOCYTES 4 3 - 10 %    EOSINOPHILS 0 0 - 5 %    BASOPHILS 0 0 - 2 %    ABS. NEUTROPHILS 7.0 1.8 - 8.0 K/UL    ABS. LYMPHOCYTES 0.7 (L) 0.9 - 3.6 K/UL    ABS. MONOCYTES 0.4 0.05 - 1.2 K/UL    ABS. EOSINOPHILS 0.0 0.0 - 0.4 K/UL    ABS.  BASOPHILS 0.0 0.0 - 0.06 K/UL    DF AUTOMATED     POC LACTIC ACID    Collection Time: 02/20/18  6:26 AM   Result Value Ref Range    Lactic Acid (POC) 2.4 (HH) 0.4 - 2.0 mmol/L   EKG, 12 LEAD, INITIAL    Collection Time: 02/20/18  6:40 AM   Result Value Ref Range    Ventricular Rate 122 BPM    Atrial Rate 122 BPM    P-R Interval 160 ms    QRS Duration 104 ms    Q-T Interval 328 ms    QTC Calculation (Bezet) 467 ms    Calculated P Axis 55 degrees    Calculated R Axis 18 degrees    Calculated T Axis -34 degrees    Diagnosis       Sinus tachycardia with premature atrial complexes  Cannot rule out Inferior infarct , age undetermined  st depreesion lateral leads  Abnormal ECG  Confirmed by Rohit Tolliver ([id number]) on 2/20/2018 8:50:31 AM     URINALYSIS W/ RFLX MICROSCOPIC    Collection Time: 02/20/18  9:25 AM   Result Value Ref Range    Color DARK YELLOW      Appearance CLOUDY      Specific gravity 1.026 1.005 - 1.030      pH (UA) 5.0 5.0 - 8.0      Protein 100 (A) NEG mg/dL    Glucose NEGATIVE  NEG mg/dL    Ketone TRACE (A) NEG mg/dL    Bilirubin NEGATIVE  NEG      Blood LARGE (A) NEG      Urobilinogen 1.0 0.2 - 1.0 EU/dL    Nitrites NEGATIVE  NEG      Leukocyte Esterase SMALL (A) NEG     URINE MICROSCOPIC ONLY    Collection Time: 02/20/18  9:25 AM   Result Value Ref Range    WBC 21 to 35 0 - 4 /hpf    RBC 21 to 35 0 - 5 /hpf    Epithelial cells FEW 0 - 5 /lpf    Bacteria 2+ (A) NEG /hpf   EKG, 12 LEAD, INITIAL    Collection Time: 02/20/18 10:26 AM   Result Value Ref Range    Ventricular Rate 122 BPM    Atrial Rate 122 BPM    P-R Interval 162 ms    QRS Duration 88 ms    Q-T Interval 326 ms    QTC Calculation (Bezet) 464 ms    Calculated P Axis 41 degrees    Calculated R Axis 24 degrees    Calculated T Axis 54 degrees    Diagnosis       Sinus tachycardia with occasional premature ventricular complexes  Possible Left atrial enlargement  Possible Anterior infarct , age undetermined  Abnormal ECG  When compared with ECG of 20-FEB-2018 06:40,  premature ventricular complexes are now present  premature atrial complexes are no longer present  Minimal criteria for Inferior infarct are no longer present     METABOLIC PANEL, BASIC    Collection Time: 02/20/18 10:45 AM   Result Value Ref Range    Sodium 147 (H) 136 - 145 mmol/L    Potassium 4.1 3.5 - 5.5 mmol/L    Chloride 115 (H) 100 - 108 mmol/L    CO2 20 (L) 21 - 32 mmol/L    Anion gap 12 3.0 - 18 mmol/L    Glucose 137 (H) 74 - 99 mg/dL    BUN 36 (H) 7.0 - 18 MG/DL    Creatinine 1.18 0.6 - 1.3 MG/DL    BUN/Creatinine ratio 31 (H) 12 - 20      GFR est AA >60 >60 ml/min/1.73m2    GFR est non-AA 59 (L) >60 ml/min/1.73m2    Calcium 8.7 8.5 - 10.1 MG/DL   HEPATIC FUNCTION PANEL    Collection Time: 02/20/18 10:45 AM   Result Value Ref Range    Protein, total 7.1 6.4 - 8.2 g/dL    Albumin 3.3 (L) 3.4 - 5.0 g/dL    Globulin 3.8 2.0 - 4.0 g/dL    A-G Ratio 0.9 0.8 - 1.7      Bilirubin, total 0.8 0.2 - 1.0 MG/DL    Bilirubin, direct 0.4 (H) 0.0 - 0.2 MG/DL    Alk. phosphatase 90 45 - 117 U/L    AST (SGOT) 238 (H) 15 - 37 U/L    ALT (SGPT) 60 16 - 61 U/L   TSH 3RD GENERATION    Collection Time: 02/20/18 10:45 AM   Result Value Ref Range    TSH 1.50 0.36 - 3.74 uIU/mL   CBC W/O DIFF    Collection Time: 02/20/18 10:45 AM   Result Value Ref Range    WBC 7.9 4.6 - 13.2 K/uL    RBC 4.19 (L) 4.70 - 5.50 M/uL    HGB 12.2 (L) 13.0 - 16.0 g/dL    HCT 37.2 36.0 - 48.0 %    MCV 88.8 74.0 - 97.0 FL    MCH 29.1 24.0 - 34.0 PG    MCHC 32.8 31.0 - 37.0 g/dL    RDW 15.1 (H) 11.6 - 14.5 %    PLATELET 580 183 - 837 K/uL    MPV 11.0 9.2 - 11.8 FL   TROPONIN I    Collection Time: 02/20/18 10:45 AM   Result Value Ref Range    Troponin-I, Qt. 25.70 (HH) 0.0 - 0.045 NG/ML   PROTHROMBIN TIME + INR    Collection Time: 02/20/18 10:45 AM   Result Value Ref Range    Prothrombin time 14.6 11.5 - 15.2 sec    INR 1.2 0.8 - 1.2     PHOSPHORUS    Collection Time: 02/20/18 10:45 AM   Result Value Ref Range    Phosphorus 4.0 2.5 - 4.9 MG/DL   MAGNESIUM    Collection Time: 02/20/18 10:45 AM   Result Value Ref Range    Magnesium 2.1 1.6 - 2.6 mg/dL       Assessment/Plan:      Principal Problem:    Heart failure, systolic, with acute decompensation (HCC) (2/20/2018)    Active Problems:    NSTEMI (non-ST elevated myocardial infarction) ([facility] [address] (2/20/2018)      Pulmonary edema (2/20/2018)      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 Currently on bumex infusion    -On BB. Nitrate gtt    -CXR:underexpanded lungs with progressive central vascular congestion and likely mild interstitial edema superimposed upon underlying pulmonary fibrotic change. -ECHO c/w 15 % to 20 %. There was severe hypokinesis / akinesis    -pro-bnp [id number]    -Cardiology following  2. NSTEMI:    -Troponin:10.20 ->25.70 ->36.00    -On aspirin.    -On heparin gtt    -On BB. Nitrate drip    -ECHO as above    -Cardiology following  3. UTI:    -On rocephin    -Ucx and Blood cx  4. COPD    -On duo-neb  5. Sleep apnea    -Allow CPAP  DVT prophylaxis:on heparin  Full code  Surrogates:son and daughter  Disposition:tbd     ___________________________________________________  Admitting Physician: Delilah Mendoza MD

## 2018-02-20 NOTE — PROGRESS NOTES
-1040-Pt. Admitted from the ER. Pt. Noted to be on nasal cannula at 2lpm. O2 sats-97% HR-119, RR-28. Bipap at bedside. Suction setup. Génesis Head is at bedside. Pt. Is awake and alert. Respiratory will continue to monitor. -[facility]    -1128-Pt. Returned to [facility] 12/5, FIO2-30% due to increased WOB. O2 Sats-97% HR-11, RR-18. Pt. Is now resting comfortably. -[address]    -1500-Pt. Noted off Bipap and on O2 via nasal cannula at 2lpm. O2 Sats-96% HR-116, RR-34. Pt. Is awake and alert. -[address]

## 2018-02-20 NOTE — CONSULTS
[facility]  Pulmonary, Critical Care, and Sleep Medicine     Name: Sascha Hatch MRN: [id number]   : 1935 Hospital: [facility]   Date of Service: 2018        Critical Care Consult          Consult requesting physician: Dr. Gaurang Ashton  Reason for Consult: acute respiratory distress    HISTORY OF PRESENT ILLNESS:     This 81 y.o. Brazilian male nonsmoker is seen in consultation at the request of Dr. Gaurang Ashton for recommendations on further evaluation and management of respiratory distress. The patient presented to the ER with complaints of increasing dyspnea over the 2 days prior to presentation. He also endorsed chest pain. He reports that he is a lifetime nonsmoker but apparently carries a history of COPD. In the ER, the patient was diagnosed with NSTEMI and acute systolic heart failure. He was started on sepsis bundle and got about 1500 mL of IV fluid resuscitation and got a dose of levofloxacin. He remained tachycardic and continued to have \"labored\" respirations and was placed on BiPAP support. At the time of clinical interview, the patient has come up to the ICU and is on BiPAP. FiO2 25%. SpO2 97%. -120. RR 0-40, classic Cheynes-Sanchez pattern. He was evaluated by Cardiology in the ER and was started on aspirin and heparin gtt. He has also been started on low-dose beta blockade. He was apparently given a single dose of furosemide. There is some confusion about the exact dose, as it appears 40 mg ordered but 20 mg administered. In any case, only a modest diuretic response has been observed with ~ 50 mL/hr. He apparently had some nausea and vomiting this am. At present, he denies nausea or vomiting. He endorses intermittent chest pain. Review of Systems:  A comprehensive review of systems was negative except for that written in the HPI.      No Known Allergies       PAST MEDICAL/SOCIAL/FAMILY HISTORIES     Past Medical History:   Diagnosis Date    Asbestosis(501)     Asthma     Chronic obstructive pulmonary disease ([facility] [address]     Hypertension       History reviewed. No pertinent surgical history. Social History   Substance Use Topics    Smoking status: Never Smoker    Smokeless tobacco: Never Used    Alcohol use No      History reviewed. No pertinent family history. Prior to Admission medications    Medication Sig Start Date End Date Taking? Authorizing Provider   ergocalciferol (VITAMIN D2) 50,000 unit capsule Take 50,000 Units by mouth every seven (7) days. Yes Charlee Holley MD   acetaminophen (TYLENOL EXTRA STRENGTH) 500 mg tablet Take 500 mg by mouth every six (6) hours as needed for Pain. Yes Charlee Holley MD   atorvastatin (LIPITOR) 10 mg tablet Take 10 mg by mouth daily. Yes Charlee Holley MD   aspirin delayed-release 81 mg tablet Take 81 mg by mouth daily. Yes Charlee Holley MD   ondansetron (ZOFRAN ODT) 4 mg disintegrating tablet Take 4 mg by mouth every eight (8) hours as needed for Nausea. Charlee Holley MD   cholecalciferol, vitamin D3, (VITAMIN D3) 2,000 unit tab Take 2,000 Units by mouth. Charlee Holley MD   albuterol (PROVENTIL HFA, VENTOLIN HFA, PROAIR HFA) 90 mcg/actuation inhaler Take 2 Puffs by inhalation every four (4) hours as needed for Wheezing. 3/5/15   Alfredo Tai MD   lisinopril (PRINIVIL, ZESTRIL) 5 mg tablet Take  by mouth daily. Charlee Holley MD   omeprazole (PRILOSEC) 20 mg capsule Take 20 mg by mouth daily. Charlee Holley MD   gabapentin (NEURONTIN) 300 mg capsule Take 300 mg by mouth three (3) times daily. Charlee Holley MD   ascorbic acid (VITAMIN C) 500 mg tablet Take  by mouth. Charlee Holley MD   HYDROcodone-acetaminophen (NORCO) 5-325 mg per tablet Take 1-2 tablets PO every 4-6 hours as needed for pain control. If over the counter ibuprofen or acetaminophen was suggested, then only take the vicodin for pain not well controlled with the over the counter medication.  7/27/14   RASHAAD Peterson   ibuprofen (MOTRIN) 600 mg tablet Take 1 Tab by mouth every six (6) hours as needed for Pain. 7/27/14   RASHAAD Santos   lansoprazole (PREVACID) 30 mg capsule Take 30 mg by mouth Daily (before breakfast). Charlee Holley MD   hydrochlorothiazide (HYDRODIURIL) 25 mg tablet Take 25 mg by mouth daily. Charlee Holley MD   simvastatin (ZOCOR) 40 mg tablet Take 40 mg by mouth nightly. Charlee Holley MD     Current Facility-Administered Medications   Medication Dose Route Frequency    heparin 25,000 units in D5W 250 ml infusion  12-25 Units/kg/hr IntraVENous TITRATE    [START ON 2/21/2018] aspirin delayed-release tablet 81 mg  81 mg Oral DAILY    sodium chloride (NS) flush 5-10 mL  5-10 mL IntraVENous Q8H    docusate sodium (COLACE) capsule 100 mg  100 mg Oral BID    atorvastatin (LIPITOR) tablet 40 mg  40 mg Oral QHS    pantoprazole (PROTONIX) 40 mg in sodium chloride 10 mL injection  40 mg IntraVENous DAILY    aspirin chewable tablet 324 mg  324 mg Oral NOW    bumetanide (BUMEX) 12.5 mg in 0.9% sodium chloride 100 mL infusion  0.5 mg/hr IntraVENous CONTINUOUS    nitroglycerin (TRIDIL) 400 mcg/ml infusion  0-20 mcg/min IntraVENous TITRATE    metoprolol (LOPRESSOR) injection 2.5 mg  2.5 mg IntraVENous ONCE    metoprolol tartrate (LOPRESSOR) tablet 25 mg  25 mg Oral Q6H       Objective: Intake/Output:   Last shift:      02/20 0701 - 02/20 1900  In: 516.4 [I.V.:516.4]  Out: 325 [Urine:225]    Last 3 shifts:        Intake/Output Summary (Last 24 hours) at 02/20/18 1251  Last data filed at 02/20/18 1200   Gross per 24 hour   Intake            516.4 ml   Output              325 ml   Net            191.4 ml       Last 3 Recorded Weights in this Encounter    02/20/18 0544 02/20/18 1008   Weight: 68 kg (150 lb) 67.2 kg (148 lb 2.4 oz)       Hemodynamics:   . @MAP  82   . @CVP       Ventilator Settings:  Mode Rate Tidal Volume Pressure FiO2 PEEP            30 %       Peak airway pressure:      Plateau pressure:     Tidal volume:    Minute ventilation: 20.1 l/min   SPO2      ARDS network Guidelines: Lung protective ventilation (6 cc/kg IBW) and Pplat <= 30 cmH2O. Physical Exam:  Vital Signs:    Visit Vitals    BP 96/62    Pulse (!) 109    Temp 97.7 °F (36.5 °C)    Resp (!) 37    Ht 5' 6\" (1.676 m)    Wt 67.2 kg (148 lb 2.4 oz)    SpO2 99%    BMI 23.91 kg/m2       O2 Device: BIPAP   O2 Flow Rate (L/min): 2 l/min   Temp (24hrs), Av.6 °F (36.4 °C), Min:96.7 °F (35.9 °C), Max:98 °F (36.7 °C)       General: alert, awake and oriented to time, person, place. Cooperative. No acute distress. Head: atraumatic, normocephalic  Eye: PERRLA, EOM intact, no scleral icterus, no pallor, no cyanosis  Nose: Nares are patent. No polyps. No exudate. No sinus tenderness. Throat: No oral thrush. No exudate. Mucous membranes are moist. No tonsillar enlargement. Neck: supple, no thyromegaly. JVD present. No lymphadenopathy. Lung: Symmetric in development and expansion. Diminished air entry. Diffuse bilateral, lower-lung predominant crackles. No wheezes. Heart: Regular S1, S2 without murmur, rub or gallop. Tachycardic. Abdomen: soft, nontender, nondistended. Normoactive bowel sounds. No rebound. No guarding. Extremities: no pedal edema, no cyanosis, no clubbing, 2+ peripheral pulses in DP  Lymphatic: no cervical/axillary/inguinal lymphadenopathy  Neurologic: Cranial nerves II-XII are grossly symmetric and physiologic. Babinski negative. No sensory deficit. No motor deficit. [email or web address], [email or web address], [email or web address]. No cerebellar signs. Gait was not assessed. Skin: No rash or lesion.       Data:     Recent Results (from the past 24 hour(s))   METABOLIC PANEL, COMPREHENSIVE    Collection Time: 18  6:04 AM   Result Value Ref Range    Sodium 148 (H) 136 - 145 mmol/L    Potassium 3.9 3.5 - 5.5 mmol/L    Chloride 115 (H) 100 - 108 mmol/L    CO2 21 21 - 32 mmol/L    Anion gap 12 3.0 - 18 mmol/L    Glucose 111 (H) 74 - 99 mg/dL    BUN 34 (H) 7.0 - 18 MG/DL    Creatinine 1.15 0.6 - 1.3 MG/DL    BUN/Creatinine ratio 30 (H) 12 - 20      GFR est AA >60 >60 ml/min/1.73m2    GFR est non-AA >60 >60 ml/min/1.73m2    Calcium 9.1 8.5 - 10.1 MG/DL    Bilirubin, total 0.6 0.2 - 1.0 MG/DL    ALT (SGPT) 52 16 - 61 U/L    AST (SGOT) 168 (H) 15 - 37 U/L    Alk. phosphatase 84 45 - 117 U/L    Protein, total 7.7 6.4 - 8.2 g/dL    Albumin 3.5 3.4 - 5.0 g/dL    Globulin 4.2 (H) 2.0 - 4.0 g/dL    A-G Ratio 0.8 0.8 - 1.7     TROPONIN I    Collection Time: 02/20/18  6:04 AM   Result Value Ref Range    Troponin-I, Qt. 10.20 (HH) 0.0 - 0.045 NG/ML   NT-PRO BNP    Collection Time: 02/20/18  6:04 AM   Result Value Ref Range    NT pro-BNP 7699 (H) 0 - 1800 PG/ML   TSH 3RD GENERATION    Collection Time: 02/20/18  6:04 AM   Result Value Ref Range    TSH 1.41 0.36 - 3.74 uIU/mL   T4, FREE    Collection Time: 02/20/18  6:04 AM   Result Value Ref Range    T4, Free 1.4 0.7 - 1.5 NG/DL   HEMOGLOBIN A1C W/O EAG    Collection Time: 02/20/18  6:04 AM   Result Value Ref Range    Hemoglobin A1c 5.6 4.2 - 5.6 %   CBC WITH AUTOMATED DIFF    Collection Time: 02/20/18  6:07 AM   Result Value Ref Range    WBC 8.1 4.6 - 13.2 K/uL    RBC 4.13 (L) 4.70 - 5.50 M/uL    HGB 12.2 (L) 13.0 - 16.0 g/dL    HCT 36.7 36.0 - 48.0 %    MCV 88.9 74.0 - 97.0 FL    MCH 29.5 24.0 - 34.0 PG    MCHC 33.2 31.0 - 37.0 g/dL    RDW 14.9 (H) 11.6 - 14.5 %    PLATELET 079 353 - 282 K/uL    MPV 11.0 9.2 - 11.8 FL    NEUTROPHILS 87 (H) 40 - 73 %    LYMPHOCYTES 9 (L) 21 - 52 %    MONOCYTES 4 3 - 10 %    EOSINOPHILS 0 0 - 5 %    BASOPHILS 0 0 - 2 %    ABS. NEUTROPHILS 7.0 1.8 - 8.0 K/UL    ABS. LYMPHOCYTES 0.7 (L) 0.9 - 3.6 K/UL    ABS. MONOCYTES 0.4 0.05 - 1.2 K/UL    ABS. EOSINOPHILS 0.0 0.0 - 0.4 K/UL    ABS.  BASOPHILS 0.0 0.0 - 0.06 K/UL    DF AUTOMATED     POC LACTIC ACID    Collection Time: 02/20/18  6:26 AM   Result Value Ref Range    Lactic Acid (POC) 2.4 (HH) 0.4 - 2.0 mmol/L   EKG, 12 LEAD, INITIAL    Collection Time: 02/20/18  6:40 AM   Result Value Ref Range    Ventricular Rate 122 BPM    Atrial Rate 122 BPM    P-R Interval 160 ms    QRS Duration 104 ms    Q-T Interval 328 ms    QTC Calculation (Bezet) 467 ms    Calculated P Axis 55 degrees    Calculated R Axis 18 degrees    Calculated T Axis -34 degrees    Diagnosis       Sinus tachycardia with premature atrial complexes  Cannot rule out Inferior infarct , age undetermined  st depreesion lateral leads  Abnormal ECG  Confirmed by Rohit Tolliver ([id number]) on 2/20/2018 8:50:31 AM     URINALYSIS W/ RFLX MICROSCOPIC    Collection Time: 02/20/18  9:25 AM   Result Value Ref Range    Color DARK YELLOW      Appearance CLOUDY      Specific gravity 1.026 1.005 - 1.030      pH (UA) 5.0 5.0 - 8.0      Protein 100 (A) NEG mg/dL    Glucose NEGATIVE  NEG mg/dL    Ketone TRACE (A) NEG mg/dL    Bilirubin NEGATIVE  NEG      Blood LARGE (A) NEG      Urobilinogen 1.0 0.2 - 1.0 EU/dL    Nitrites NEGATIVE  NEG      Leukocyte Esterase SMALL (A) NEG     URINE MICROSCOPIC ONLY    Collection Time: 02/20/18  9:25 AM   Result Value Ref Range    WBC 21 to 35 0 - 4 /hpf    RBC 21 to 35 0 - 5 /hpf    Epithelial cells FEW 0 - 5 /lpf    Bacteria 2+ (A) NEG /hpf   EKG, 12 LEAD, INITIAL    Collection Time: 02/20/18 10:26 AM   Result Value Ref Range    Ventricular Rate 122 BPM    Atrial Rate 122 BPM    P-R Interval 162 ms    QRS Duration 88 ms    Q-T Interval 326 ms    QTC Calculation (Bezet) 464 ms    Calculated P Axis 41 degrees    Calculated R Axis 24 degrees    Calculated T Axis 54 degrees    Diagnosis       Sinus tachycardia with occasional premature ventricular complexes  Possible Left atrial enlargement  Possible Anterior infarct , age undetermined  Abnormal ECG  When compared with ECG of 20-FEB-2018 06:40,  premature ventricular complexes are now present  premature atrial complexes are no longer present  Minimal criteria for Inferior infarct are no longer present     METABOLIC PANEL, BASIC    Collection Time: 02/20/18 10:45 AM   Result Value Ref Range    Sodium 147 (H) 136 - 145 mmol/L    Potassium 4.1 3.5 - 5.5 mmol/L    Chloride 115 (H) 100 - 108 mmol/L    CO2 20 (L) 21 - 32 mmol/L    Anion gap 12 3.0 - 18 mmol/L    Glucose 137 (H) 74 - 99 mg/dL    BUN 36 (H) 7.0 - 18 MG/DL    Creatinine 1.18 0.6 - 1.3 MG/DL    BUN/Creatinine ratio 31 (H) 12 - 20      GFR est AA >60 >60 ml/min/1.73m2    GFR est non-AA 59 (L) >60 ml/min/1.73m2    Calcium 8.7 8.5 - 10.1 MG/DL   HEPATIC FUNCTION PANEL    Collection Time: 02/20/18 10:45 AM   Result Value Ref Range    Protein, total 7.1 6.4 - 8.2 g/dL    Albumin 3.3 (L) 3.4 - 5.0 g/dL    Globulin 3.8 2.0 - 4.0 g/dL    A-G Ratio 0.9 0.8 - 1.7      Bilirubin, total 0.8 0.2 - 1.0 MG/DL    Bilirubin, direct 0.4 (H) 0.0 - 0.2 MG/DL    Alk.  phosphatase 90 45 - 117 U/L    AST (SGOT) 238 (H) 15 - 37 U/L    ALT (SGPT) 60 16 - 61 U/L   TSH 3RD GENERATION    Collection Time: 02/20/18 10:45 AM   Result Value Ref Range    TSH 1.50 0.36 - 3.74 uIU/mL   CBC W/O DIFF    Collection Time: 02/20/18 10:45 AM   Result Value Ref Range    WBC 7.9 4.6 - 13.2 K/uL    RBC 4.19 (L) 4.70 - 5.50 M/uL    HGB 12.2 (L) 13.0 - 16.0 g/dL    HCT 37.2 36.0 - 48.0 %    MCV 88.8 74.0 - 97.0 FL    MCH 29.1 24.0 - 34.0 PG    MCHC 32.8 31.0 - 37.0 g/dL    RDW 15.1 (H) 11.6 - 14.5 %    PLATELET 018 058 - 023 K/uL    MPV 11.0 9.2 - 11.8 FL   TROPONIN I    Collection Time: 02/20/18 10:45 AM   Result Value Ref Range    Troponin-I, Qt. 25.70 (HH) 0.0 - 0.045 NG/ML   PROTHROMBIN TIME + INR    Collection Time: 02/20/18 10:45 AM   Result Value Ref Range    Prothrombin time 14.6 11.5 - 15.2 sec    INR 1.2 0.8 - 1.2     PHOSPHORUS    Collection Time: 02/20/18 10:45 AM   Result Value Ref Range    Phosphorus 4.0 2.5 - 4.9 MG/DL   MAGNESIUM    Collection Time: 02/20/18 10:45 AM   Result Value Ref Range    Magnesium 2.1 1.6 - 2.6 mg/dL           No results for input(s): FIO2I, IFO2, HCO3I, IHCO3, HCOPOC, PCO2I, PCOPOC, IPHI, PHI, PHPOC, PO2I, PO2POC in the last 72 hours. No lab exists for component: IPOC2    Recent Labs      02/20/18   1045  02/20/18   0607   WBC  7.9  8.1   HGB  12.2*  12.2*   HCT  37.2  36.7   PLT  238  217     Recent Labs      02/20/18   1045  02/20/18   0604   NA  147*  148*   K  4.1  3.9   CL  115*  115*   CO2  20*  21   GLU  137*  111*   BUN  36*  34*   CREA  1.18  1.15   CA  8.7  9.1   MG  2.1   --    PHOS  4.0   --    ALB  3.3*  3.5   SGOT  238*  168*   ALT  60  52   INR  1.2   --      No results for input(s): PH, PCO2, PO2, HCO3, FIO2 in the last 72 hours.     Lab Results   Component Value Date/Time    Color DARK YELLOW 02/20/2018 09:25 AM    Appearance CLOUDY 02/20/2018 09:25 AM    Specific gravity 1.026 02/20/2018 09:25 AM    pH (UA) 5.0 02/20/2018 09:25 AM    Protein 100 (A) 02/20/2018 09:25 AM    Glucose NEGATIVE  02/20/2018 09:25 AM    Ketone TRACE (A) 02/20/2018 09:25 AM    Bilirubin NEGATIVE  02/20/2018 09:25 AM    Urobilinogen 1.0 02/20/2018 09:25 AM    Nitrites NEGATIVE  02/20/2018 09:25 AM    Leukocyte Esterase SMALL (A) 02/20/2018 09:25 AM    Epithelial cells FEW 02/20/2018 09:25 AM    Bacteria 2+ (A) 02/20/2018 09:25 AM    WBC 21 to 35 02/20/2018 09:25 AM    RBC 21 to 35 02/20/2018 09:25 AM       Telemetry: normal sinus rhythm    Imaging:  [x] I have personally reviewed the patients radiographs  [] Radiographs reviewed with radiologist  [] No CXR study available for review today  [] No change from prior study, tubes and lines are in adequate position  [] Improved   []Worsening    CXR (2/20): increased interstitial markings       ASSESSMENT:   Principal Problem:    Heart failure, systolic, with acute decompensation ([facility] [address] (2/20/2018)    Active Problems:    NSTEMI (non-ST elevated myocardial infarction) ([facility] [address] (2/20/2018)      Pulmonary edema (2/20/2018)      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 On aspirin and heparin gtt. Add Tridil gtt. Increase beta blockade. Order for Bumex gtt noted. Will try an additional dose of furosemide 40 mg prior to initiating Bumex gtt. · BiPAP 10/8 PRN. Titrate FiO2 to keep SpO2 93+%. This patient is exhibiting Cheyne-Sanchez respiration, compatible with acute heart failure. · Empiric treatment of urinary tract infection with Rocephin. Follow up urine culture. · Nutrition: NPO for now  · Glucose stabilizer per ICU protocol  · Replace electrolytes per ICU electrolyte replacement protocol  · HOB >=30 degree  · PT/OT eval and treat  · GI Prophylaxis with Protonix  · DVT Prophylaxis with heparin gtt  · Further recommendations will be based on the patient's response to recommended treatment and results of the investigation ordered. The patient is: [x] acutely ill Risk of deterioration: [] moderate    [x] critically ill  [x] high     My assessment, plan of care, findings, medications, side effects, etc were discussed with:  [x] Nurse [] PT/OT    [x] Respiratory therapy []     [] Family [x] Patient: answered all questions to satisfaction   [] Pharmacist []      [x] Case & management strategies discussed today on multidisciplinary rounds    []Total critical care time spent: - minutes, reviewing the case, medical record, data, notes, EMR, patient examination, documentation, coordinating care with nurse/consultants, exclusive of procedures (with complex decision making performed and > 50% time spent in face to face evaluation).       Elida Beasley MD  2/20/2018

## 2018-02-20 NOTE — ED NOTES
7:10 :Pt care assumed from Dr. Simi Hinds , ED provider. Pt complaint(s), current treatment plan, progression and available diagnostic results have been discussed thoroughly. Rounding occurred: yes  Intended Disposition: ADMIT   Pending diagnostic reports and/or labs (please list): Pending Labs Trop and BNP    Took over care from Dr. Simi Hinds at 8465. Pt presented with SOB, sepsis bundle initiate by previous provider. Pt with tachycardia into 120's and tachypnea on my exam with clear lungs and patchy bilateral infiltrates on CXR concerning for pulmonary edema vs infection. BNP and troponin pending with ECG showing ST depressions concerning for ischemia. Pt report SOB starting yesterday and n/v this morning. ROS positive for mild chest pain. Pt is a poor historian.   7:34 AM troponin just resulted at 10. Getting repeat ECG, trop paging cardiology and starting Heparin, ASA. Will eval at bedside echo, EF and for pericardial effusion. Pt admitted to ICU. See ED Course below    ED Course   Comment By Time   Faintly positive hemoccult blood in stool Yuliana Kwong,  02/20 1988         0740: Consult:  Discussed care with RASHAAD Shah (Cardiology). Standard discussion; including history of patients chief complaint, available diagnostic results, and treatment course. Consult for NSTEMI. Obtain formal echo and cardiogly consult. 1016: Consult:  Discussed care with Dr. Michael Odell (Hospitalist). Standard discussion; including history of patients chief complaint, available diagnostic results, and treatment course. Agrees to admission. 5923 Formal echo at bedside. Appears pt has reduced EF and regional wall motion, depressed LV function. 1 Dr. Michael Odell and Dr. Poonam Cali (Cardiology) at bedside; Poonam Cali recommends increasing Lasix to 40 from 20. Starting BiPAP, respiratory called. Pt was given ASA and in a joint decision we decided to discontinue Abx due to low suspicion for infection and trying to prevent fluid overload. Pt will be admitted to the ICU.    0855 Consult:  Discussed care with   (Intensivist). Standard discussion; including history of patients chief complaint, available diagnostic results, and treatment course. Agrees to consult, pt will go to ICU. ED EKG interpretation:  07:45 , sinus tach with PACs, intervals wnl, nml axis, diffuse ST depressions in the inferor and lateral leads with pronounced concave ST segments in v1 and v2 unchanged from prior ECG. This EKG was interpreted by Gunner Monae DO. Scribe Attestation     Tracy Amador acting as a scribe for and in the presence of Gunner Monae DO     February 20, 2018 at 7:35 AM       Provider Attestation:      I personally performed the services described in the documentation, reviewed the documentation, as recorded by the scribe in my presence, and it accurately and completely records my words and actions.  February 20, 2018 at 7:35 AM - Gunner Monae DO

## 2018-02-20 NOTE — CONSULTS
[facility] - Consult Note    Consultation request by Dr. Rosemarie Rai for advice/opinion related to evaluating NSTEMI    Date of  Admission: 2/20/2018  5:38 AM   Primary Care Physician:  None      I saw, evaluated, interviewed and examined the patient personally. I agree with the findings and plan of care as documented below with PABIANKA note  Came to ED with acute on chronic dyspnea, fluid overload   Also NSTEMI  ECHO at bedside performed and reviewed personally  Cardiomypathy with EF 15-20% with RWMA. If it was acute major ACS causing such a low EF, I would suspect heh would be in much worse and hemodynamically unstable    Following ordered in ED by me  Lasix 40 mg IV once  BiPAP for respiratory suppose. Patient dysnic andunable to lay flat  Wynne catheter  IV lopressor 2.5 mg once  IV heparin ACS protocol  Stool guiace as patient states on and off some blood in stool    Following ordered on floor:  IV lopressor 2.5 mg once  Lopressor 12.5 mg TID  BiPAP  Bumex drip 0.5 mg / hour for IV diuresis    Will need coronary evaluation but patient currently not a candidate. Can not lay flat and still dyspnea  Tried to call wife [phone] but not correct number, called son and left message. Petra Whitaker MD         Assessment:     -Respiratory distress, tachypnic and tachycardic, ? Multifactorial with CHF, NSTEMI and underlying pulmonary disease  -NSTEMI, troponin 10.2, lateral ST depression  - Cardiomyopathy: LVEF 15-20% with RWMA. Takotsubo vs multivessel CAD  -COPD  -Sarcoidosis  -Asthma  -HTN     Plan:     Pt presented with respiratory distress. He was found to have elevated troponin 10.2, along with BNP 7.7K with JVD on exam.    -Verbal orders for Lopressor 2.5 mg IV along with Lasix 40 mg IV and BiPAP given in ED. Will monitor initial response to IV Lasix. Monitor Cr.   Need strict I/O's as ordered by primary team.    -He was given ASA SC this AM, starting PO ASA tomorrow AM.  Starting on low-dose Lopressor 12.5 mg TID. Lipitor scheduled to start tonight.    -Plan to pursue ischemic workup once condition stabilizes. History of Present Illness: This is a 81 y.o. male admitted for NSTEMI (non-ST elevated myocardial infarction) ([facility] Patient complains of:  Shortness of breath    Pt is an 80 yo M who presented to the ED due to shortness of breath. Pt reports chronic shortness breath that has been worse over the last couple months but especially worse over the several hours/couple days preceding presentation this morning. He also reports some chest tightness this morning. Pt appears to be in respiratory distress, and additional history is limited due to patient condition. Cardiac risk factors: male gender, hypertension      Review of Symptoms:      Review of systems not obtained due to patient factors. Past Medical History:     Past Medical History:   Diagnosis Date    Asbestosis(501)     Asthma     Chronic obstructive pulmonary disease ([facility]     Hypertension          Social History:     Social History     Social History    Marital status:      Spouse name: N/A    Number of children: N/A    Years of education: N/A     Social History Main Topics    Smoking status: Never Smoker    Smokeless tobacco: Never Used    Alcohol use No    Drug use: No    Sexual activity: Not Asked     Other Topics Concern    None     Social History Narrative        Family History:   History reviewed. No pertinent family history.      Medications:   No Known Allergies     Current Facility-Administered Medications   Medication Dose Route Frequency    sodium chloride (NS) flush 5-10 mL  5-10 mL IntraVENous PRN    heparin 25,000 units in D5W 250 ml infusion  12-25 Units/kg/hr IntraVENous TITRATE    albuterol-ipratropium (DUO-NEB) 2.5 MG-0.5 MG/3 ML  3 mL Nebulization Q4H PRN    pantoprazole (PROTONIX) 40 mg in sodium chloride 10 mL injection  40 mg IntraVENous DAILY    ondansetron (ZOFRAN) injection 4 mg  4 mg IntraVENous Q6H PRN    aspirin chewable tablet 324 mg  324 mg Oral NOW     Current Outpatient Prescriptions   Medication Sig    ergocalciferol (VITAMIN D2) 50,000 unit capsule Take 50,000 Units by mouth every seven (7) days.  acetaminophen (TYLENOL EXTRA STRENGTH) 500 mg tablet Take 500 mg by mouth every six (6) hours as needed for Pain.  atorvastatin (LIPITOR) 10 mg tablet Take 10 mg by mouth daily.  aspirin delayed-release 81 mg tablet Take 81 mg by mouth daily.  ondansetron (ZOFRAN ODT) 4 mg disintegrating tablet Take 4 mg by mouth every eight (8) hours as needed for Nausea.  cholecalciferol, vitamin D3, (VITAMIN D3) 2,000 unit tab Take 2,000 Units by mouth.  albuterol (PROVENTIL HFA, VENTOLIN HFA, PROAIR HFA) 90 mcg/actuation inhaler Take 2 Puffs by inhalation every four (4) hours as needed for Wheezing.  lisinopril (PRINIVIL, ZESTRIL) 5 mg tablet Take  by mouth daily.  omeprazole (PRILOSEC) 20 mg capsule Take 20 mg by mouth daily.  gabapentin (NEURONTIN) 300 mg capsule Take 300 mg by mouth three (3) times daily.  ascorbic acid (VITAMIN C) 500 mg tablet Take  by mouth.  HYDROcodone-acetaminophen (NORCO) 5-325 mg per tablet Take 1-2 tablets PO every 4-6 hours as needed for pain control. If over the counter ibuprofen or acetaminophen was suggested, then only take the vicodin for pain not well controlled with the over the counter medication.  ibuprofen (MOTRIN) 600 mg tablet Take 1 Tab by mouth every six (6) hours as needed for Pain.  lansoprazole (PREVACID) 30 mg capsule Take 30 mg by mouth Daily (before breakfast).  hydrochlorothiazide (HYDRODIURIL) 25 mg tablet Take 25 mg by mouth daily.  simvastatin (ZOCOR) 40 mg tablet Take 40 mg by mouth nightly.            Physical Exam:     Visit Vitals    /81    Pulse (!) 123    Temp 96.7 °F (35.9 °C)    Resp (!) 34    Ht 5' 6\" (1.676 m)    Wt 150 lb (68 kg)    SpO2 100%    BMI 24.21 kg/m2     BP Readings from Last 3 Encounters:   02/20/18 119/81   02/13/18 132/67   01/15/18 116/90     Pulse Readings from Last 3 Encounters:   02/20/18 (!) 123   02/13/18 78   01/15/18 93     Wt Readings from Last 3 Encounters:   02/20/18 150 lb (68 kg)   01/15/18 175 lb (79.4 kg)   07/27/14 174 lb (78.9 kg)       General:  alert, cooperative, in respiratory distress, appears stated age  Neck:  + JVD  Lungs:  Rales most prominent at bases  Heart:  Tachycardic slightly irregular rhythm  Abdomen:  abdomen is soft without significant tenderness, masses, organomegaly or guarding  Extremities:  mild edema to lower extremities  Skin: Warm and dry.    Neuro: alert, no focal neurological deficits, moves all extremities well, no involuntary movements  Psych: non focal     Data Review:     Recent Labs      02/20/18   0607   WBC  8.1   HGB  12.2*   HCT  36.7   PLT  217     Recent Labs      02/20/18   0604   NA  148*   K  3.9   CL  115*   CO2  21   GLU  111*   BUN  34*   CREA  1.15   CA  9.1   ALB  3.5   SGOT  168*   ALT  52       Results for orders placed or performed during the hospital encounter of 02/20/18   EKG, 12 LEAD, INITIAL   Result Value Ref Range    Ventricular Rate 122 BPM    Atrial Rate 122 BPM    P-R Interval 160 ms    QRS Duration 104 ms    Q-T Interval 328 ms    QTC Calculation (Bezet) 467 ms    Calculated P Axis 55 degrees    Calculated R Axis 18 degrees    Calculated T Axis -34 degrees    Diagnosis       Sinus tachycardia with premature atrial complexes  Cannot rule out Inferior infarct , age undetermined  st depreesion lateral leads  Abnormal ECG  Confirmed by Ayad Gavin ([id number]) on 2/20/2018 8:50:31 AM         All Cardiac Markers in the last 24 hours:    Lab Results   Component Value Date/Time    TROIQ 10.20 (HH) 02/20/2018 06:04 AM       Last Lipid:    Lab Results   Component Value Date/Time    Cholesterol, total 187 05/18/2010 02:21 PM    HDL Cholesterol 52 05/18/2010 02:21 PM    LDL,Direct 111 (H) 05/18/2010 02:21 PM    Triglyceride 109 10/27/2009 02:43 PM       Signed By: Courtney Mosqueda PA-C     February 20, 2018        Total critical care time spent in reviewing the case, multiple EMR databases, physician notes, procedure notes, examining the patient, and documentation, is >90 minutes. Discussed in details with patient at bedside. All questions were answered to their full satisfaction. Risk, benefit and alternatives were discussed. >50% time spent in counseling and coordination of care.

## 2018-02-20 NOTE — PROGRESS NOTES
Called to the ED to start patient on by BIPAP 12/5 30% for increase WOB. - Per Dr. Jackelyn Curling.   - NO ABG Orders at this time.   - Suction at the bedside if needed.

## 2018-02-20 NOTE — PROGRESS NOTES
Pharmacy Dosing Services: Vancomycin    Indication: Sepsis     Day of therapy: 0    Other Antimicrobials (Include dose, start day & day of therapy):  Piperacillin/tazobactam 4.5 gm IV every 6 hours (started )  Levofloxacin 750 mg IV x 1 in ED ()      Loading dose (date given): 1750 mg  Current Maintenance dose: none at this time    Goal Vancomycin Level: 15-20  (Trough 15-20 for most infections, 20 for meningitis/osteomyelitis, pre-HD level ~25)    Vancomycin Level (if drawn): none at this time     Significant Cultures:    Blood - pending    Renal function stable? (unstable defined as SCr increase of 0.5 mg/dL or > 50% increase from baseline, whichever is greater) (Y/N): Y     CAPD, Hemodialysis or Renal Replacement Therapy (Y/N): N     Recent Labs      18   0607  18   0604   CREA   --   1.15   BUN   --   34*   WBC  8.1   --      Temp (24hrs), Av.3 °F (36.3 °C), Min:96.7 °F (35.9 °C), Max:97.8 °F (36.6 °C)    Creatinine Clearance (Creatinine Clearance (ml/min)): 45 ml/min     Regimen assessment:    New vancomycin start   Piperacillin/tazobactam dosed appropriately based on renal function and indication. Maintenance dose: 1000 mg IV every 18 hours  Next scheduled level:  at 1430       Pharmacy will follow daily and adjust medications as appropriate for renal function and/or serum levels.     Thank you,  Radha Guerrero, PHARMD

## 2018-02-21 PROBLEM — N17.9 ACUTE RENAL FAILURE (HCC): Status: ACTIVE | Noted: 2018-01-01

## 2018-02-21 NOTE — PROGRESS NOTES
2000 Assumed care of pt after bedside report. Neuro intact. AAO x 4. IVAN x 4. Monitor denotes ST. Lungs coarse and O2 in progress at 4L/NC. Abd soft and nontender. Denies c/o SOB or CP. Pt has episodes of tachypnea and Cheynes-Sanchez respirations. Pt on Heparin drip as ordered and NTG drip as ordered. Plunkett catheter in place and draining meera urine. 2200 Pt status unchanged. Continues to deny SOB and chest pain. 2345 Critical lab result of troponin 50.90. 2357 Result called to Dr. Carrillo Hutchison. No orders given. Pt denies chest pain. 02/21/2018 0100 Pt status unchanged. 0400 AM labs drawn and sent to lab.0549 Heparin drip rate changed to 13 unitd/kg/hr as per protocol for PTT 64.9. 0610 Pt's plunkett catheter is leaking. Plunkett irrigated without difficulty. Bed pads, gown, linens changed. Skin care given. Denies c/o chest pain, SOB.

## 2018-02-21 NOTE — PROGRESS NOTES
Problem: Falls - Risk of  Goal: *Absence of Falls  Document Silvio Fall Risk and appropriate interventions in the flowsheet.    Outcome: Progressing Towards Goal  Fall Risk Interventions:  Mobility Interventions: Bed/chair exit alarm         Medication Interventions: Bed/chair exit alarm    Elimination Interventions: Bed/chair exit alarm, Toileting schedule/hourly rounds

## 2018-02-21 NOTE — PROGRESS NOTES
cdmp query:Acute Hypoxic/Hypercapnic Respiratory Failure sec to Acute Systolic Chf  and Nstemi  requiring Bi-pap

## 2018-02-21 NOTE — PROGRESS NOTES
-0340-Pt. Noted to be awake and alert off Bipap. O2 Sats-100% HR-105, RR-33.  On O2 via nasal cannula at 2lpm.-[facility]

## 2018-02-21 NOTE — PROGRESS NOTES
12- Assumed care of pt. Report received by Karol Mcfadden RN   Pt a/ox4, IVAN, FC. L +3, R irregularly shaped \"I see spots sometimes\"  ST with occasional PVCs on the monitor, pulses palpable. Lungs diminished without wheeze on 2L. Mild SOB noted. Ab soft, non tender. + bowel sounds. Wynne draining clear yellow urine  Pt has no complaints of chest pain or discomfort. 1030- aPTT 75.6, Hep gtt remains at 13 units/kg/hr     1114- Critical troponin (45.3) called. Trending down    1200- Reassessment done. Upper lobes clear, lower lobes with crackles  Pt has no complaints of pain, discomfort, N/V. Resting comfortably at this time     [id number] Pt taken to CT via bed on the portable monitor with 2L NC. Pt tolerated well, returned to room in stable condition     1905- Bedside and Verbal shift change report given to [address] (oncoming nurse) by Love Valera. Baldo Gaitan RN  (offgoing nurse).  Report included the following information SBAR, Kardex, MAR, Recent Results and Cardiac Rhythm ST.

## 2018-02-21 NOTE — PROGRESS NOTES
[facility]  -  Progress Note      Patient: Jessica Armas MRN: [id number]  SSN: [id number]    YOB: 1935  Age: 81 y.o. Sex: male      Admit Date: 2/20/2018      I saw, evaluated, interviewed and examined the patient personally. I agree with the findings and plan of care as documented below with PA-C note  NSTEMI  Cardiomyoapthy  Dyspnea likely multifactorial. Marginal response to IV bumex. Able to lay flat with O2 sats 95% and above. No PND but still dyspnea. ?? Underlying lung pathology  Worsening renal function. Stop Diuretics. Recommend CT chest without contrast. ICU physician to order  Continue IV heparin and IV NTG  ASA and statin  DW son at bedside. DW diagnosis and management. Both medical management and invasive evaluation discussed. For now medical management per request.    Joshua Gale MD        Assessment:     -Respiratory distress, tachypnic and tachycardic, ? Multifactorial with CHF, NSTEMI and underlying pulmonary disease  -NSTEMI, troponin 10.2>25.7>36>50.9, lateral ST depression  - Cardiomyopathy: LVEF 15-20% with RWMA. Takotsubo vs multivessel CAD  -COPD  -Sarcoidosis  -Asthma  -HTN    Plan:     -Pt lying flat without difficulty. He has had minimal diuresis, only -168 ml s/p 40 mg IV x 3. His Bumex gtts was not started until ~06:00 this AM and subsequently d/c by intensivist.  Pt to get dry CT chest today to evaluate for underlying pulmonary disease.    -Will give Digoxin 250 mcg IV once now, then start on PO Digoxin tomorrow AM.  -Continue ASA, Lipitor, Lopressor.  -Continue Heparin gtts and nitroglycerin gtts. -Discussed ischemic workup/cardiac catheterization with patient's son, he needs time to think about cath. Subjective:     Resting flat in bed. Denies pain.     Objective:      Patient Vitals for the past 8 hrs:   Temp Pulse Resp BP SpO2   02/21/18 0700 - (!) 108 (!) 37 98/68 94 %   02/21/18 0600 - (!) 103 (!) 34 104/64 96 %   02/21/18 0500 - (!) 120 (!) 33 103/78 94 %   02/21/18 0400 - (!) 118 (!) 31 112/61 94 %   02/21/18 0300 - (!) 112 (!) 31 100/58 96 %   02/21/18 0200 - (!) 116 (!) 32 105/67 96 %   02/21/18 0100 - (!) 119 (!) 31 98/71 98 %   02/21/18 0000 97.9 °F (36.6 °C) (!) 117 (!) 32 103/71 97 %         Patient Vitals for the past 96 hrs:   Weight   02/20/18 1008 148 lb 2.4 oz (67.2 kg)   02/20/18 0544 150 lb (68 kg)         Intake/Output Summary (Last 24 hours) at 02/21/18 5160  Last data filed at 02/21/18 0100   Gross per 24 hour   Intake              967 ml   Output             1135 ml   Net             -168 ml       Physical Exam:  General:  Resting but will answer questions appropriately, cooperative, appears stated age  Lungs:  Occasional expiratory wheeze noted to left lateral lung field  Heart:  Tachycardic irregular rhythm  Abdomen:  abdomen is soft without significant tenderness, masses, organomegaly or guarding  Extremities:  no edema     Data Review:     Labs: Results:       Chemistry Recent Labs      02/20/18   1803  02/20/18   1045  02/20/18   0604   GLU  135*  137*  111*   NA  148*  147*  148*   K  4.3  4.1  3.9   CL  115*  115*  115*   CO2  19*  20*  21   BUN  40*  36*  34*   CREA  1.59*  1.18  1.15   CA  8.9  8.7  9.1   MG   --   2.1   --    PHOS   --   4.0   --    AGAP  14  12  12   BUCR  25*  31*  30*   AP   --   90  84   TP   --   7.1  7.7   ALB   --   3.3*  3.5   GLOB   --   3.8  4.2*   AGRAT   --   0.9  0.8      CBC w/Diff Recent Labs      02/20/18   1045  02/20/18   0607   WBC  7.9  8.1   RBC  4.19*  4.13*   HGB  12.2*  12.2*   HCT  37.2  36.7   PLT  238  217   GRANS   --   87*   LYMPH   --   9*   EOS   --   0      Cardiac Enzymes Lab Results   Component Value Date/Time    CPK 1383 (H) 02/20/2018 06:03 PM    CPK 1192 (H) 02/20/2018 01:28 PM    CKMB 143.6 (H) 02/20/2018 06:03 PM    CKMB 151.4 (H) 02/20/2018 01:28 PM    CKND1 10.4 (H) 02/20/2018 06:03 PM    CKND1 12.7 (H) 02/20/2018 01:28 PM    TROIQ 50.90 (HH) 02/20/2018 06:03 PM    TROIQ 36.00 ([facility]) 02/20/2018 01:28 PM    TROIQ 25.70 ([facility]) 02/20/2018 10:45 AM      Coagulation Recent Labs      02/21/18   0300  02/20/18   1328  02/20/18   1045   PTP   --    --   14.6   INR   --    --   1.2   APTT  64.9*  77.7*   --        Lipid Panel Lab Results   Component Value Date/Time    Cholesterol, total 140 02/21/2018 03:00 AM    HDL Cholesterol 57 02/21/2018 03:00 AM    LDL,Direct 111 (H) 05/18/2010 02:21 PM    LDL, calculated 66.6 02/21/2018 03:00 AM    VLDL, calculated 16.4 02/21/2018 03:00 AM    Triglyceride 82 02/21/2018 03:00 AM    CHOL/HDL Ratio 2.5 02/21/2018 03:00 AM      Liver Enzymes Recent Labs      02/20/18   1045   TP  7.1   ALB  3.3*   AP  90   SGOT  238*      Thyroid Studies Lab Results   Component Value Date/Time    TSH 1.50 02/20/2018 10:45 AM

## 2018-02-21 NOTE — CDMP QUERY
Please clarify if this patient is being treated/managed for:    =>Acute Hypoxic/Hypercapnic Respiratory Failure sec to Acute Systolic Chf  and Nstemi  requiring Bi-pap   =>Other Explanation of clinical findings  =>Unable to Determine (no explanation of clinical findings)    The medical record reflects the following:    Risk: age 81, with hx of copd, admits with chf and nstemi      Clinical Indicators: p 123, rr 37, sats 93 % nc, + jvd, lactic acid 2.4, bnp 35960, cheyne sanchez respirations, b/l rales     RT Assessment   0250: Mr. Renae Mcclellan was noted to be tachypneic at this time with congested cough. His respiratory pattern is varying between Kussmaul's and Cheyne-Sanchez. His RN is aware. He has fine crackles bilaterally with audible expiratory wheezes. PRN Duoneb treatment given without much improvement. Patient placed on BIPAP at 0300. He was on BIPAP x1 hour with no noticeable improvement in respiratory status. He was placed back on nasal cannula @ 4LPM. Will continue to monitor.       Pulm Assessment   He remained tachycardic and continued to have \"labored\" respirations and was placed on BiPAP support. At the time of clinical interview, the patient has come up to the ICU and is on BiPAP. FiO2 25%. SpO2 97%. -120. RR 0-40, classic Cheynes-Sanchez pattern.   Treatment:  lasix, hep, bumex, bi-pap     Thank you,  Liss Alvarez RN/CCDS  [phone]

## 2018-02-21 NOTE — PROGRESS NOTES
Patient has designated his wife to participate in his/her discharge plan and to receive any needed information.      Name:   Steven Ohara  spouse   / [address], [place] Reese [place], [facility]   Feb 21, 2018     Address:  Phone number:

## 2018-02-21 NOTE — PROGRESS NOTES
Hospitalist Progress Note  Ezequiel Gallegos MD  Internal medicine/ Hospitalist    Daily Progress Note: 2/21/2018 5:15 PM      Interval history / Subjective:   Davion Rajan is a 81 y.o.  male with h/o asthma,copd,was to the emergency room because of short of breath. Patient is a poor historian and no family member at the time of my evaluation,so the history is mostly obtained from the ER attending. It was reported that was brought to the emergency room because of short of breath of several hours. Initially started on iv fluid and antibiotics for possible sepsis by the ER doctor who initially saw patient. When the replacing ER arrived and reviewed the cxr and lab results,it was noted that patient had elevated troponin at 10.20 and CXR showed pulmonary edema. Lasix iv was given and cardiology was consulted for NSTEMI. While talking to patient,he has reported having some chest pain but was not specific. On 2/21,bumex gtt d/c'marbin as creatinine went up to 2.18,pt will be on lasix prn. ACE-I/ARB on hold due to MAREK.       Current Facility-Administered Medications   Medication Dose Route Frequency    pantoprazole (PROTONIX) tablet 40 mg  40 mg Oral ACB    [START ON 2/22/2018] digoxin (LANOXIN) tablet 0.25 mg  0.25 mg Oral DAILY    sodium chloride (NS) flush 5-10 mL  5-10 mL IntraVENous PRN    heparin 25,000 units in D5W 250 ml infusion  12-25 Units/kg/hr IntraVENous TITRATE    acetaminophen (TYLENOL) tablet 500 mg  500 mg Oral Q6H PRN    aspirin delayed-release tablet 81 mg  81 mg Oral DAILY    sodium chloride (NS) flush 5-10 mL  5-10 mL IntraVENous Q8H    sodium chloride (NS) flush 5-10 mL  5-10 mL IntraVENous PRN    magnesium hydroxide (MILK OF MAGNESIA) 400 mg/5 mL oral suspension 30 mL  30 mL Oral DAILY PRN    docusate sodium (COLACE) capsule 100 mg  100 mg Oral BID    morphine injection 2 mg  2 mg IntraVENous Q4H PRN    nitroglycerin (NITROSTAT) tablet 0.4 mg  0.4 mg SubLINGual Q5MIN PRN    atorvastatin (LIPITOR) tablet 40 mg  40 mg Oral QHS    albuterol-ipratropium (DUO-NEB) 2.5 MG-0.5 MG/3 ML  3 mL Nebulization Q4H PRN    ondansetron (ZOFRAN) injection 4 mg  4 mg IntraVENous Q6H PRN    nitroglycerin (TRIDIL) 400 mcg/ml infusion  0-20 mcg/min IntraVENous TITRATE    metoprolol tartrate (LOPRESSOR) tablet 25 mg  25 mg Oral Q6H    cefTRIAXone (ROCEPHIN) 1 g in sterile water (preservative free) 10 mL IV syringe  1 g IntraVENous Q24H    metoprolol (LOPRESSOR) injection 2.5 mg  2.5 mg IntraVENous Q6H PRN        Review of Systems  Pt more awake today,saying that he feels fine,less short of breath. Objective:     Visit Vitals    BP 98/59    Pulse (!) 106    Temp 97.6 °F (36.4 °C)    Resp 18    Ht 5' 6\" (1.676 m)    Wt 66.1 kg (145 lb 11.6 oz)    SpO2 95%    BMI 23.52 kg/m2    O2 Flow Rate (L/min): 2 l/min O2 Device: Nasal cannula    Temp (24hrs), Av.8 °F (36.6 °C), Min:97.6 °F (36.4 °C), Max:98.3 °F (36.8 °C)       07 - 1900  In: 242 [P.O.:120; I.V.:122]  Out: 565 [Urine:565]  1901 -  0700  In: 1032.2 [P.O.:290; I.V.:722.2]  Out: 1435 [YDXFP:9061]  P/E  NAD,lying comfortably in bed,interacting more than yesterday. Heent:perrla,at/nc,mouth moist.  Lungs crackles at the bases. Heart s1s2 nl,no m/g  Abdm:soft,not tender,bs present. Extr:trace edema,good pedis pulses. Neuro:aaox3. Moving all extremities,follows commands    Data Review    Recent Results (from the past 12 hour(s))   METABOLIC PANEL, BASIC    Collection Time: 18  8:25 AM   Result Value Ref Range    Sodium 146 (H) 136 - 145 mmol/L    Potassium 4.4 3.5 - 5.5 mmol/L    Chloride 112 (H) 100 - 108 mmol/L    CO2 17 (L) 21 - 32 mmol/L    Anion gap 17 3.0 - 18 mmol/L    Glucose 120 (H) 74 - 99 mg/dL    BUN 61 (H) 7.0 - 18 MG/DL    Creatinine 2.18 (H) 0.6 - 1.3 MG/DL    BUN/Creatinine ratio 28 (H) 12 - 20      GFR est AA 35 (L) >60 ml/min/1.73m2    GFR est non-AA 29 (L) >60 ml/min/1.73m2    Calcium 8.8 8.5 - 10.1 MG/DL CARDIAC PANEL,(CK, CKMB & TROPONIN)    Collection Time: 02/21/18  8:25 AM   Result Value Ref Range    CK 1164 (H) 39 - 308 U/L    CK - MB 59.9 (H) <3.6 ng/ml    CK-MB Index 5.1 (H) 0.0 - 4.0 %    Troponin-I, Qt. 45.30 (HH) 0.0 - 0.045 NG/ML   PTT    Collection Time: 02/21/18 10:30 AM   Result Value Ref Range    aPTT 75.6 (H) 23.0 - 36.4 SEC   CBC W/O DIFF    Collection Time: 02/21/18 10:30 AM   Result Value Ref Range    WBC 10.3 4.6 - 13.2 K/uL    RBC 4.57 (L) 4.70 - 5.50 M/uL    HGB 13.5 13.0 - 16.0 g/dL    HCT 40.9 36.0 - 48.0 %    MCV 89.5 74.0 - 97.0 FL    MCH 29.5 24.0 - 34.0 PG    MCHC 33.0 31.0 - 37.0 g/dL    RDW 15.3 (H) 11.6 - 14.5 %    PLATELET 364 126 - 650 K/uL    MPV 12.0 (H) 9.2 - 11.8 FL         Assessment/Plan:     Principal Problem:    Heart failure, systolic, with acute decompensation ([facility] (2/20/2018)    Active Problems:    NSTEMI (non-ST elevated myocardial infarction) ([facility] (2/20/2018)      Pulmonary edema (2/20/2018)      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 CHF,systolic with acute exacerbation:    -Pt received iv lasix in ER. Then started on bumex infusion.    -On 2/21 bumex gtt d/c'ed as creatinine went up. Now on prn lasix    -On BB. Nitrate gtt. ACE-I/ARB on hold    -CXR on 2/20:underexpanded lungs with progressive central vascular congestion and likely mild interstitial edema superimposed upon underlying pulmonary fibrotic change. -ECHO c/w 15 % to 20 %. There was severe hypokinesis / akinesis    -pro-bnp 1366    -Cardiology following  2. NSTEMI:    -Troponin:10.20 ->25.70 ->36.00    -On aspirin.    -On heparin gtt    -On BB. Nitrate drip    -ECHO as above    -Cardiology following  3. UTI:    -On rocephin    -Ucx and Blood cx NGTD  4. COPD    -On duo-neb  5. Sleep apnea    -Allow CPAP    DVT prophylaxis:on heparin  Full code  Surrogates:son and daughter  Disposition:tbd

## 2018-02-21 NOTE — PROGRESS NOTES
[facility] [address]   Discharge Planning/ Assessment    Reasons for Intervention: Eric Lakhani to  Interview patient, he is sleeping. Called the patient wife and she was unavailable. Called patient son. He states the patient used a walker to assist with ambulation. Demographic information verified. He does not recall the name of the patients PCP. He stated he would consider discharging to a skilled nursing faculty if the patient is able to tolerate therapy. It is unclear at this time if he will be able to participate with therapy. Palliative care has been consulted. Case management will follow for discharge needs. His discharge is dependent upon the decisions made during Palliative care meeting.       High Risk Criteria  [x] Yes  []No   Physician Referral  [] Yes  [x]No        Date    Nursing Referral  [] Yes  [x]No        Date    Patient/Family Request  [] Yes  [x]No        Date       Resources:    Medicare  [x] Yes  []No   Medicaid  [x] Yes  []No   No Resources  [] Yes  [x]No   Private Insurance  [] Yes  [x]No     Name/Phone Number    Other  [] Yes  [x]No        (i.e. Workman's Comp)         Prior Services:    Prior Services  [] Yes  [x]No   Home Health  [] Yes  [x]No   [address] [place]  [] Yes  [x]No        Number of [address]  [] Yes  [x]No       Meals on Wheels  [] Yes  [x]No   Office on Aging  [] Yes  [x]No   Transportation Services  [] Yes  [x]No   Nursing Home  [] Yes  [x]No        Nursing Home Name    [facility]  [] Yes  [x]No        [address] Name    Other       Information Source:      Information obtained from  [x] Patient  [] Parent   [] [address]  [] Child  [] Spouse   [] Significant Other/Partner   [] Friend      [] EMS    [] Nursing Home Chart          [x] Other: chart   Chart Review  [x] Yes  []No     Family/Support System:    Patient lives with  [] Alone    [x] Spouse   [] Significant Other  [] Children  [] Caretaker   [] Parent  [] Sibling     [] Other       Other Support System:    Is the patient responsible for care of others  [] Yes  [x]No   Information of person caring for patient on  discharge self   Managers financial affairs independently  [x] Yes  []No   If no, explain:      Status Prior to Admission:    Mental Status  [x] Awake  [x] Alert  [x] Oriented  [] Quiet/Calm [] Lethargic/Sedated   [] Disoriented  [] Restless/Anxious  [] Combative   Personal Care  [] Dependent  [x] [address]  [] Requires Assistance   Meal Preparation Ability  [x] Independent   [] Standby Assistance   [] Minimal Assistance   [] Moderate Assistance  [] Maximum Assistance     [] Total Assistance   Chores  [x] Independent with Chores   [] [address] [facility] Resident   [] Requires Assistance   Bowel/Bladder  [x] Continent  [] Catheter  [] Incontinent  [] Ostomy Self-Care    [] Urine Diversion Self-Care  [] Maximum Assistance     [] Total Assistance   Number of Persons needed for assistance    DME at home  [] Caprice Fragmin, [place]  [] Caprice Fragmin, Straight   [] Commode    [] Bathroom/Grab Bars  [] Hospital Bed  [] Nebulizer  [] Oxygen           [] Raised Toilet Seat  [] Shower Chair  [] Side Rails for Bed   [] Tub Transfer Bench   [x] Marlin Vargas  [] Mindy Gonzales      [] Other:   Vendor      Treatment Presently Receiving:    Current Treatments  [] Chemotherapy  [] Dialysis  [] Insulin  [] IVAB [x] IVF   [] O2  [] PCA   [] PT   [] RT   [] Tube Feedings   [] Wound Care     Psychosocial Evaluation:    Verbalized Knowledge of Disease Process  [x] Patient  [x]Family   Coping with Disease Process  [x] Patient  [x]Family   Requires Further Counseling Coping with Disease Process  [] Patient  []Family     Identified Projected Needs:    Home Health Aid  [] Yes  [x]No   Transportation  [] Yes  [x]No   Education  [] Yes  [x]No        Specific Education     Financial Counseling  [] Yes  [x]No   Inability to Care for Self/Will Require 24 hour care  [] Yes  [x]No Pain Management  [] Yes  [x]No   Home Infusion Therapy  [] Yes  [x]No   Oxygen Therapy  [] Yes  [x]No   DME  [] Yes  [x]No   Long Term Care Placement  [] Yes  [x]No   Rehab  [] Yes  [x]No   Physical Therapy  [] Yes  [x]No   Needs Anticipated At This Time  [] Yes  [x]No     Intra-Hospital Referral:    [facility]  [] Yes  [x]No     [] Yes  [x]No   Patient Representative  [] Yes  [x]No   Staff for Teaching Needs  [] Yes  [x]No   Specialty Teaching Needs     Diabetic Educator  [] Yes  [x]No   Referral for Diabetic Educator Needed  [] Yes  [x]No  If Yes, place order for Nutritionist or Diabetic Consult     Tentative Discharge Plan:    Home with No Services  [x] Yes  []No   Home with Home Health Follow-up  [] Yes  [x]No        If Yes, specify type    Home Care Program  [] Yes  [x]No        If Yes, specify type    Meals on Wheels  [] Yes  [x]No   Office of Aging  [] Yes  [x]No   NHP  [] Yes  [x]No   Return to the Nursing Home  [] Yes  [x]No   Rehab Therapy  [] Yes  [x]No   Acute Rehab  [] Yes  [x]No   Subacute Rehab  [] Yes  [x]No   Private Care  [] Yes  [x]No   Substance Abuse Referral  [] Yes  [x]No   Transportation  [] Yes  [x]No   Chore Service  [] Yes  [x]No   Inpatient Hospice  [] Yes  [x]No   OP RT  [] Yes  [x] No   OP Hemo  [] Yes  [x] No   OP PT  [] Yes  [x]No   Support Group  [] Yes  [x]No   Reach to Recovery  [] Yes  [x]No   OP Oncology Clinic  [] Yes  [x]No   Clinic Appointment  [] Yes  [x]No   DME  [] Yes  [x]No   Comments    Name of D/C Planner or  Given to Patient or Family Jerry Sandoval RN   Phone Number [phone] extension [phone]   Date Feb 21, 2018   Time 737 am   If you are discharged home, whom do you designate to participate in your discharge plan and receive any information needed?      Enter name of [address] [place] [address]        Phone # of designee / [phone]        Address of [address] [place] [address] [address] [facility], [address], [facility] Updated Feb 21, 2018        Patient refused to designate any           individual

## 2018-02-21 NOTE — PROGRESS NOTES
46: Mr. Renae Mcclellan was noted to be tachypneic at this time with congested cough. His respiratory pattern is varying between Kussmaul's and Cheyne-Sanchez. His RN is aware. He has fine crackles bilaterally with audible expiratory wheezes. PRN Duoneb treatment given without much improvement. Patient placed on BIPAP at 0300. He was on BIPAP x1 hour with no noticeable improvement in respiratory status. He was placed back on nasal cannula @ 4LPM. Will continue to monitor.

## 2018-02-21 NOTE — PROGRESS NOTES
Problem: Falls - Risk of  Goal: *Absence of Falls  Document Silvio Fall Risk and appropriate interventions in the flowsheet.    Outcome: Progressing Towards Goal  Fall Risk Interventions:  Mobility Interventions: Bed/chair exit alarm         Medication Interventions: Bed/chair exit alarm    Elimination Interventions: Bed/chair exit alarm, Call light in reach, Toileting schedule/hourly rounds

## 2018-02-21 NOTE — CDMP QUERY
Please clarify if this patient is being treated/managed for:    =>Hypernatremia with Sodium 148  =>Other Explanation of clinical findings  =>Unable to Determine (no explanation of clinical findings)    The medical record reflects the following:    Risk: chf, nstemi,     Clinical Indicators: sod 148     Treatment:  repeat labs,     Please clarify and document your clinical opinion in the progress notes and discharge summary including the definitive and/or presumptive diagnosis, (suspected or probable), related to the above clinical findings. Please include clinical findings supporting your diagnosis. If you DECLINE this query or would like to communicate with [facility], please utilize the \"[employer] message box\" at the TOP of the Progress Note on the right.       Thank you,  Gabino Matamoros RN/CCDS   [phone]

## 2018-02-21 NOTE — PROGRESS NOTES
[facility]   Pulmonary, Critical Care, and Sleep Medicine     Critical Care Progress Note    Name: Patt Diamond   : 1935   MRN: [id number]   Date: 2018                                 [x]I have reviewed the flowsheet and previous days notes. Events, vitals, medications and notes from last 24 hours reviewed. Care plan discussed on multidisciplinary rounds. [] The patient is unable to give any meaningful history or review of systems because the patient is:  [] Intubated [] Sedated   [] Unresponsive []      [x]The patient is critically ill on      [] Mechanical ventilation [] Vasoactive agents   [x] BiPAP [] Inotropes                 SUBJECTIVE  - This 81 y.o. [place] male nonsmoker was originally on 2018 seen in consultation at the request of Dr. Kamla Murray for recommendations on further evaluation and management of respiratory distress. The patient presented to the ER with complaints of increasing dyspnea over the 2 days prior to presentation. He also endorsed chest pain. He reports that he is a lifetime nonsmoker but apparently carries a history of COPD. In the ER, the patient was diagnosed with NSTEMI and acute systolic heart failure. He was started on sepsis bundle and got about 1500 mL of IV fluid resuscitation and got a dose of levofloxacin. He remained tachycardic and continued to have \"labored\" respirations and was placed on BiPAP support. At the time of clinical interview, the patient has come up to the ICU and is on BiPAP. FiO2 25%. SpO2 97%. -120. RR 0-40, classic Cheynes-Sanchez pattern.     He was evaluated by Cardiology in the ER and was started on aspirin and heparin gtt. He has also been started on low-dose beta blockade. He was apparently given a single dose of furosemide. There is some confusion about the exact dose, as it appears 40 mg ordered but 20 mg administered.  In any case, only a modest diuretic response has been observed with ~ 50 mL/hr. He apparently had some nausea and vomiting this am. At present, he denies nausea or vomiting. He endorses intermittent chest pain. - Overnight events: endorses nausea. Had a bout of emesis. Denies chest pain. On Tridil gtt. Started Bumex gtt overnight. U/O ~25 mL/hr. Denies orthopnea or paroxysmal nocturnal dyspnea. [x] Nutrition: cardiac  [] BM today. ROS: A comprehensive review of systems was negative except for that written in the HPI.     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)     Asthma     Chronic obstructive pulmonary disease ([facility] [address]     Hypertension         Allergy:  No Known Allergies       Current Facility-Administered Medications:     sodium chloride (NS) flush 5-10 mL, 5-10 mL, IntraVENous, PRN, Michelle Juarez MD    heparin 25,000 units in D5W 250 ml infusion, 12-25 Units/kg/hr, IntraVENous, TITRATE, Vish Pope DO, Last Rate: 8.8 mL/hr at 02/21/18 0549, 13 Units/kg/hr at 02/21/18 0549    acetaminophen (TYLENOL) tablet 500 mg, 500 mg, Oral, Q6H PRN, Radha Jamison MD    aspirin delayed-release tablet 81 mg, 81 mg, Oral, DAILY, Radha Jamison MD    sodium chloride (NS) flush 5-10 mL, 5-10 mL, IntraVENous, Q8H, Radha Jamison MD, 10 mL at 02/21/18 0556    sodium chloride (NS) flush 5-10 mL, 5-10 mL, IntraVENous, PRN, Radha Jamison MD    magnesium hydroxide (MILK OF MAGNESIA) 400 mg/5 mL oral suspension 30 mL, 30 mL, Oral, DAILY PRN, Radha Jamison MD    docusate sodium (COLACE) capsule 100 mg, 100 mg, Oral, BID, Radha Jamison MD, 100 mg at 02/20/18 1800    morphine injection 2 mg, 2 mg, IntraVENous, Q4H PRN, Radha Jamison MD    nitroglycerin (NITROSTAT) tablet 0.4 mg, 0.4 mg, SubLINGual, Q5MIN PRN, Radha Jamison MD    atorvastatin (LIPITOR) tablet 40 mg, 40 mg, Oral, QHS, Radha Jamison MD, 40 mg at 02/20/18 7646    albuterol-ipratropium (DUO-NEB) 2.5 MG-0.5 MG/3 ML, 3 mL, Nebulization, Q4H PRN, Bethany Watts MD, Stopped at 18 9835    pantoprazole (PROTONIX) 40 mg in sodium chloride 10 mL injection, 40 mg, IntraVENous, DAILY, Bethany Watts MD, 40 mg at 18 0909    ondansetron (ZOFRAN) injection 4 mg, 4 mg, IntraVENous, Q6H PRN, Bethany Watts MD    bumetanide (BUMEX) 12.5 mg in 0.9% sodium chloride 100 mL infusion, 0.5 mg/hr, IntraVENous, CONTINUOUS, Alyssa Rai PA-C, Last Rate: 4 mL/hr at 18 0548, 0.5 mg/hr at 18 0548    nitroglycerin (TRIDIL) 400 mcg/ml infusion, 0-20 mcg/min, IntraVENous, TITRATE, José Miguel Maldonado MD, Last Rate: 1.5 mL/hr at 18 1328, 10 mcg/min at 18 1328    metoprolol tartrate (LOPRESSOR) tablet 25 mg, 25 mg, Oral, Q6H, José Miguel Maldonado MD, 25 mg at 18 0510    cefTRIAXone (ROCEPHIN) 1 g in sterile water (preservative free) 10 mL IV syringe, 1 g, IntraVENous, Q24H, José Miguel Maldonado MD, 1 g at 18 1428    metoprolol (LOPRESSOR) injection 2.5 mg, 2.5 mg, IntraVENous, Q6H PRN, Alyssa Rai PA-C      OBJECTIVE  Vital Signs:    Visit Vitals    BP 98/59    Pulse 99    Temp 97.7 °F (36.5 °C)    Resp 28    Ht 5' 6\" (1.676 m)    Wt 67.2 kg (148 lb 2.4 oz)    SpO2 98%    BMI 23.91 kg/m2       O2 Device: Nasal cannula   O2 Flow Rate (L/min): 2 l/min   Temp (24hrs), Av.1 °F (36.7 °C), Min:97.7 °F (36.5 °C), Max:98.7 °F (37.1 °C)       PHYSICAL EXAM:   General: alert, awake and oriented to time, person, place. Cooperative. No acute distress. Head: atraumatic, normocephalic  Eye: OD blindness, no scleral icterus, no pallor, no cyanosis  Nose: Nares are patent. No polyps. No exudate. No sinus tenderness. Throat: No oral thrush. No exudate. Mucous membranes are moist. No tonsillar enlargement. Neck: supple, no thyromegaly. JVD present. No lymphadenopathy. Lung: Symmetric in development and expansion. Diminished air entry. Diffuse bilateral, lower-lung predominant crackles. No wheezes.    Heart: Regular S1, S2 without murmur, rub or gallop. Tachycardic. Abdomen: soft, nontender, nondistended. Normoactive bowel sounds. No rebound. No guarding. Extremities: no pedal edema, no cyanosis, no clubbing, 2+ peripheral pulses in DP  Lymphatic: no cervical/axillary/inguinal lymphadenopathy  Neurologic: Cranial nerves II-XII are grossly symmetric and physiologic. Babinski negative. No sensory deficit. No motor deficit. DTR [email or web address], 2+@LUE, 2+@RLE, 2+@LLE. No cerebellar signs. Gait was not assessed. Skin: No rash or lesion. DATA:   CVP: - mmHg  SvO2 - % from distal port of CVC    Intake/Output:   Last shift:      02/21 0701 - 02/21 1900  In: 28.6 [I.V.:28.6]  Out: 45 [Urine:45]    Last 3 shifts: 02/19 1901 - 02/21 0700  In: 1032.2 [P.O.:290; I.V.:722.2]  Out: 1435 [Urine:1335]      Intake/Output Summary (Last 24 hours) at 02/21/18 0949  Last data filed at 02/21/18 0900   Gross per 24 hour   Intake           560.81 ml   Output             1380 ml   Net          -819.19 ml       Last 3 Recorded Weights in this Encounter    02/20/18 0544 02/20/18 1008   Weight: 68 kg (150 lb) 67.2 kg (148 lb 2.4 oz)     Lines: All central lines examined by me. No signs of erythema, induration, discharge. Peripheral Intravenous Line:  Peripheral IV 02/20/18 Right Antecubital (Active)   Site Assessment Clean, dry, & intact 2/21/2018  9:00 AM   Phlebitis Assessment 0 2/21/2018  9:00 AM   Infiltration Assessment 0 2/21/2018  9:00 AM   Dressing Status Clean, dry, & intact 2/21/2018  8:00 AM   Dressing Type Transparent 2/21/2018  8:00 AM   Hub Color/Line Status Green;Patent; Flushed; Infusing 2/21/2018  8:00 AM   Action Taken Open ports on tubing capped 2/21/2018  8:00 AM   Alcohol Cap Used Yes 2/21/2018  8:00 AM       Peripheral IV 02/20/18 Left Arm (Active)   Site Assessment Clean, dry, & intact 2/21/2018  9:00 AM   Phlebitis Assessment 0 2/21/2018  9:00 AM   Infiltration Assessment 0 2/21/2018  9:00 AM   Dressing Status Clean, dry, & intact 2/21/2018  8:00 AM   Dressing Type Transparent 2/21/2018  8:00 AM   Hub Color/Line Status Pink;Patent; Flushed; Infusing 2/21/2018  8:00 AM   Action Taken Open ports on tubing capped 2/21/2018  8:00 AM   Alcohol Cap Used Yes 2/21/2018  8:00 AM     Telemetry: [x]Sinus []A-flutter []Paced    []A-fib []Multiple PVCs     Imaging:  [x] I have personally reviewed the patients radiographs  [] Radiographs reviewed with radiologist  [] No CXR study available for review today  [] No change from prior, tubes and lines are in adequate position  [] Improved   [x] Worsening    CXR: (2/21) asymmetrical pulmonary edema versus pneumonia versus ARDS with progression on  the right. Cannot exclude minimal bilateral pleural effusions.                Labs:  Recent Results (from the past 24 hour(s))   EKG, 12 LEAD, INITIAL    Collection Time: 02/20/18 10:26 AM   Result Value Ref Range    Ventricular Rate 122 BPM    Atrial Rate 122 BPM    P-R Interval 162 ms    QRS Duration 88 ms    Q-T Interval 326 ms    QTC Calculation (Bezet) 464 ms    Calculated P Axis 41 degrees    Calculated R Axis 24 degrees    Calculated T Axis 54 degrees    Diagnosis       Sinus tachycardia with occasional premature ventricular complexes  Possible Left atrial enlargement  Possible Anterior infarct , age undetermined  Abnormal ECG  When compared with ECG of 20-FEB-2018 06:40,  premature ventricular complexes are now present  premature atrial complexes are no longer present  Minimal criteria for Inferior infarct are no longer present     METABOLIC PANEL, BASIC    Collection Time: 02/20/18 10:45 AM   Result Value Ref Range    Sodium 147 (H) 136 - 145 mmol/L    Potassium 4.1 3.5 - 5.5 mmol/L    Chloride 115 (H) 100 - 108 mmol/L    CO2 20 (L) 21 - 32 mmol/L    Anion gap 12 3.0 - 18 mmol/L    Glucose 137 (H) 74 - 99 mg/dL    BUN 36 (H) 7.0 - 18 MG/DL    Creatinine 1.18 0.6 - 1.3 MG/DL    BUN/Creatinine ratio 31 (H) 12 - 20      GFR est AA >60 >60 ml/min/1.73m2    GFR est non-AA 59 (L) >60 ml/min/1.73m2    Calcium 8.7 8.5 - 10.1 MG/DL   HEPATIC FUNCTION PANEL    Collection Time: 02/20/18 10:45 AM   Result Value Ref Range    Protein, total 7.1 6.4 - 8.2 g/dL    Albumin 3.3 (L) 3.4 - 5.0 g/dL    Globulin 3.8 2.0 - 4.0 g/dL    A-G Ratio 0.9 0.8 - 1.7      Bilirubin, total 0.8 0.2 - 1.0 MG/DL    Bilirubin, direct 0.4 (H) 0.0 - 0.2 MG/DL    Alk.  phosphatase 90 45 - 117 U/L    AST (SGOT) 238 (H) 15 - 37 U/L    ALT (SGPT) 60 16 - 61 U/L   TSH 3RD GENERATION    Collection Time: 02/20/18 10:45 AM   Result Value Ref Range    TSH 1.50 0.36 - 3.74 uIU/mL   CBC W/O DIFF    Collection Time: 02/20/18 10:45 AM   Result Value Ref Range    WBC 7.9 4.6 - 13.2 K/uL    RBC 4.19 (L) 4.70 - 5.50 M/uL    HGB 12.2 (L) 13.0 - 16.0 g/dL    HCT 37.2 36.0 - 48.0 %    MCV 88.8 74.0 - 97.0 FL    MCH 29.1 24.0 - 34.0 PG    MCHC 32.8 31.0 - 37.0 g/dL    RDW 15.1 (H) 11.6 - 14.5 %    PLATELET 956 130 - 798 K/uL    MPV 11.0 9.2 - 11.8 FL   TROPONIN I    Collection Time: 02/20/18 10:45 AM   Result Value Ref Range    Troponin-I, Qt. 25.70 (HH) 0.0 - 0.045 NG/ML   PROTHROMBIN TIME + INR    Collection Time: 02/20/18 10:45 AM   Result Value Ref Range    Prothrombin time 14.6 11.5 - 15.2 sec    INR 1.2 0.8 - 1.2     PHOSPHORUS    Collection Time: 02/20/18 10:45 AM   Result Value Ref Range    Phosphorus 4.0 2.5 - 4.9 MG/DL   MAGNESIUM    Collection Time: 02/20/18 10:45 AM   Result Value Ref Range    Magnesium 2.1 1.6 - 2.6 mg/dL   PTT    Collection Time: 02/20/18  1:28 PM   Result Value Ref Range    aPTT 77.7 (H) 23.0 - 36.4 SEC   CARDIAC PANEL,(CK, CKMB & TROPONIN)    Collection Time: 02/20/18  1:28 PM   Result Value Ref Range    CK 1192 (H) 39 - 308 U/L    CK - .4 (H) <3.6 ng/ml    CK-MB Index 12.7 (H) 0.0 - 4.0 %    Troponin-I, Qt. 36.00 (HH) 0.0 - 0.045 NG/ML   EKG, 12 LEAD, INITIAL    Collection Time: 02/20/18  3:19 PM   Result Value Ref Range    Ventricular Rate 113 BPM    Atrial Rate 113 BPM    P-R Interval 160 ms    QRS Duration 86 ms    Q-T Interval 336 ms    QTC Calculation (Bezet) 460 ms    Calculated P Axis 46 degrees    Calculated R Axis 25 degrees    Calculated T Axis 84 degrees    Diagnosis       Sinus tachycardia  Possible Left atrial enlargement  ST & T wave abnormality, consider lateral ischemia  Abnormal ECG  When compared with ECG of 20-FEB-2018 10:26,  premature ventricular complexes are no longer present     METABOLIC PANEL, BASIC    Collection Time: 02/20/18  6:03 PM   Result Value Ref Range    Sodium 148 (H) 136 - 145 mmol/L    Potassium 4.3 3.5 - 5.5 mmol/L    Chloride 115 (H) 100 - 108 mmol/L    CO2 19 (L) 21 - 32 mmol/L    Anion gap 14 3.0 - 18 mmol/L    Glucose 135 (H) 74 - 99 mg/dL    BUN 40 (H) 7.0 - 18 MG/DL    Creatinine 1.59 (H) 0.6 - 1.3 MG/DL    BUN/Creatinine ratio 25 (H) 12 - 20      GFR est AA 51 (L) >60 ml/min/1.73m2    GFR est non-AA 42 (L) >60 ml/min/1.73m2    Calcium 8.9 8.5 - 10.1 MG/DL   CARDIAC PANEL,(CK, CKMB & TROPONIN)    Collection Time: 02/20/18  6:03 PM   Result Value Ref Range    CK 1383 (H) 39 - 308 U/L    CK - .6 (H) <3.6 ng/ml    CK-MB Index 10.4 (H) 0.0 - 4.0 %    Troponin-I, Qt. 50.90 (HH) 0.0 - 0.045 NG/ML   LIPID PANEL    Collection Time: 02/21/18  3:00 AM   Result Value Ref Range    LIPID PROFILE          Cholesterol, total 140 <200 MG/DL    Triglyceride 82 <150 MG/DL    HDL Cholesterol 57 40 - 60 MG/DL    LDL, calculated 66.6 0 - 100 MG/DL    VLDL, calculated 16.4 MG/DL    CHOL/HDL Ratio 2.5 0 - 5.0     NT-PRO BNP    Collection Time: 02/21/18  3:00 AM   Result Value Ref Range    NT pro-BNP 76358 (H) 0 - 1800 PG/ML   PTT    Collection Time: 02/21/18  3:00 AM   Result Value Ref Range    aPTT 64.9 (H) 23.0 - 36.4 SEC           No results for input(s): FIO2I, IFO2, HCO3I, IHCO3, HCOPOC, PCO2I, PCOPOC, IPHI, PHI, PHPOC, PO2I, PO2POC in the last 72 hours.     No lab exists for component: IPOC2    Recent Labs      02/20/18   1045  02/20/18   0607   WBC  7.9  8.1   HGB 12.2*  12.2*   HCT  37.2  36.7   PLT  238  217     Recent Labs      02/20/18   1803  02/20/18   1045  02/20/18   0604   NA  148*  147*  148*   K  4.3  4.1  3.9   CL  115*  115*  115*   CO2  19*  20*  21   GLU  135*  137*  111*   BUN  40*  36*  34*   CREA  1.59*  1.18  1.15   CA  8.9  8.7  9.1   MG   --   2.1   --    PHOS   --   4.0   --    ALB   --   3.3*  3.5   SGOT   --   238*  168*   ALT   --   60  52   INR   --   1.2   --      No results for input(s): PH, PCO2, PO2, HCO3, FIO2 in the last 72 hours. Lab Results   Component Value Date/Time    Color DARK YELLOW 02/20/2018 09:25 AM    Appearance CLOUDY 02/20/2018 09:25 AM    Specific gravity 1.026 02/20/2018 09:25 AM    pH (UA) 5.0 02/20/2018 09:25 AM    Protein 100 (A) 02/20/2018 09:25 AM    Glucose NEGATIVE  02/20/2018 09:25 AM    Ketone TRACE (A) 02/20/2018 09:25 AM    Bilirubin NEGATIVE  02/20/2018 09:25 AM    Urobilinogen 1.0 02/20/2018 09:25 AM    Nitrites NEGATIVE  02/20/2018 09:25 AM    Leukocyte Esterase SMALL (A) 02/20/2018 09:25 AM    Epithelial cells FEW 02/20/2018 09:25 AM    Bacteria 2+ (A) 02/20/2018 09:25 AM    WBC 21 to 35 02/20/2018 09:25 AM    RBC 21 to 35 02/20/2018 09:25 AM       Cultures:   Blood Culture: (2/20) NGTD  Urine Culture: (2/20) NGTD      ASSESSMENT/PLAN:   Principal Problem:    Heart failure, systolic, with acute decompensation ([facility] [address] (2/20/2018)    Active Problems:    NSTEMI (non-ST elevated myocardial infarction) ([facility] [address] (2/20/2018)      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 Emiliano Phoenix. Will stop Bumex gtt. Furosemide PRN. Chest CT WITHOUT contrast today (Dx: dyspnea). Titrate Tridil for chest pain and nausea (as I believe this is his anginal equivalent). Monitor renal function. On empiric Rocephin for urinary tract infection. Follow up urine cultures. On aspirin, nitroglycerin, metoprolol, atorvastatin. ACE-I/ARB on hold with rising creatinine. Renal dosing of medications. Glucose control: glucose stabilization per ICU protocol    NUTRITION: Cardiac diet. Check prealbumin q week. Consult Clinical Dietician. ICU electrolyte replacement protocol    PROPHYLAXIS:  DVT prophylaxis: heparin  GI prophylaxis: Protonix  HOB 30-degree elevation  Chlorhexidine mouth washes    CODE STATUS: FULL CODE    Further recommendations will be based on the patient's response to recommended treatment and results of the investigation ordered. DISPOSITION:    The patient is: [x] acutely ill Risk of deterioration: [x] moderate    [] critically ill  [x] high     [x]See my orders for details    My assessment, plan of care, findings, medications, side effects etc were discussed with:  [x] Nurse [] PT/OT    [x] Respiratory therapy [x] Dr. Antonio Tripp   [x] Family [] Patient: answered all questions to satisfaction   [x] Pharmacist []      [x] Case & management strategies discussed today on multidisciplinary rounds    During this entire length of time I was immediately available to the patient. The reason for providing this level of medical care for this critically ill patient was due a critical illness that impaired one or more vital organ systems such that there was a high probability of imminent or life threatening deterioration in the patients condition.  This care involved high complexity decision making to assess, manipulate, and support vital system functions, to treat this degreee vital organ system failure and to prevent further life threatening deterioration of the patients condition    []Total critical care time spent on reviewing the case/medical record/data/notes/EMR/patient examination/documentation/coordinating care with nurse/consultants, exclusive of procedures - minutes with complex decision making performed and > 50% time spent in face to face evaluation.         Jose Snell MD   2/21/2018

## 2018-02-22 PROBLEM — R06.02 SOB (SHORTNESS OF BREATH): Status: ACTIVE | Noted: 2018-01-01

## 2018-02-22 PROBLEM — Z71.89 ADVANCE CARE PLANNING: Status: ACTIVE | Noted: 2018-01-01

## 2018-02-22 PROBLEM — R53.81 DEBILITY: Status: ACTIVE | Noted: 2018-01-01

## 2018-02-22 NOTE — PROGRESS NOTES
Problem: Falls - Risk of  Goal: *Absence of Falls  Document Silvio Fall Risk and appropriate interventions in the flowsheet.    Outcome: Progressing Towards Goal  Fall Risk Interventions:  Mobility Interventions: Bed/chair exit alarm         Medication Interventions: Bed/chair exit alarm    Elimination Interventions: Bed/chair exit alarm, Call light in reach

## 2018-02-22 NOTE — MED STUDENT NOTES
Progress Note    Patient: Kyle Hairston MRN: [id number]  SSN: [id number]    YOB: 1935  Age: 81 y.o. Sex: male      Admit Date: 2/20/2018    LOS: 2 days     Subjective:   MICHAEL Plasencia Score is a 81year old male with a history of HTN, asbestosis and asthma who presented to ED 2 days ago with shortness of breath, with associated nausea and vomiting. Per ED note, patient was brought in by EMS in respiratory distress for acute on chronic shortness of breath for several hours. He is a nonsmoker, and is not on home oxygen. Patient was started on sepsis protocol with hypotension at 104/48, tachycardia at 124bpm and lactic acid of 2.4, and he had CXR showing patchy bilateral infiltrates. This was discontinued with low suspicion for infection after he had EKG in ED showing sinus tachy with lateral ST depression, concerning for ischemia, with troponin of 10.2 and BNP of 7700. Echo showed decreased ejection fraction, wall motion and left ventricular function. Patient was given Lasix, and was admitted. Patient also had urinarlysis with evidence of infection, and was started on Rocephin 1g. Patient states he feels better today, with improvement of chest pain and shortness of breath. He denies any new symptoms. Past Medical History:   Diagnosis Date    Asbestosis(501)     Asthma     Chronic obstructive pulmonary disease ([facility] [address]     Hypertension        History reviewed. No pertinent surgical history. No current facility-administered medications on file prior to encounter. Current Outpatient Prescriptions on File Prior to Encounter   Medication Sig Dispense Refill    aspirin delayed-release 81 mg tablet Take 81 mg by mouth daily.  ondansetron (ZOFRAN ODT) 4 mg disintegrating tablet Take 4 mg by mouth every eight (8) hours as needed for Nausea.  cholecalciferol, vitamin D3, (VITAMIN D3) 2,000 unit tab Take 2,000 Units by mouth.       albuterol (PROVENTIL HFA, VENTOLIN HFA, PROAIR HFA) 90 mcg/actuation inhaler Take 2 Puffs by inhalation every four (4) hours as needed for Wheezing. 1 Inhaler 0    lisinopril (PRINIVIL, ZESTRIL) 5 mg tablet Take  by mouth daily.  omeprazole (PRILOSEC) 20 mg capsule Take 20 mg by mouth daily.  gabapentin (NEURONTIN) 300 mg capsule Take 300 mg by mouth three (3) times daily.  ascorbic acid (VITAMIN C) 500 mg tablet Take  by mouth.  HYDROcodone-acetaminophen (NORCO) 5-325 mg per tablet Take 1-2 tablets PO every 4-6 hours as needed for pain control. If over the counter ibuprofen or acetaminophen was suggested, then only take the vicodin for pain not well controlled with the over the counter medication. 12 Tab 0    ibuprofen (MOTRIN) 600 mg tablet Take 1 Tab by mouth every six (6) hours as needed for Pain. 20 Tab 0    lansoprazole (PREVACID) 30 mg capsule Take 30 mg by mouth Daily (before breakfast).  hydrochlorothiazide (HYDRODIURIL) 25 mg tablet Take 25 mg by mouth daily.  simvastatin (ZOCOR) 40 mg tablet Take 40 mg by mouth nightly. No Known Allergies    History reviewed. No pertinent family history. Social History     Social History    Marital status:      Spouse name: N/A    Number of children: N/A    Years of education: N/A     Occupational History    Not on file. Social History Main Topics    Smoking status: Never Smoker    Smokeless tobacco: Never Used    Alcohol use No    Drug use: No    Sexual activity: Not on file     Other Topics Concern    Not on file     Social History Narrative       ROS:  General: No fever or fatigue  Cardiovascular: +chest pain. No palpitations   Respiratory: + shortness of breath.  No cough   GI: No abdominal pain, vomiting or diarrhea   : No dysuria or increased urinary frequency   Neurological: No headache or sensory changes       Objective:     Patient Vitals for the past 8 hrs:   Temp Pulse Resp BP SpO2   02/22/18 0900 - (!) 107 (!) 6 98/60 99 %   02/22/18 0800 97.7 °F (36.5 °C) (!) 105 20 99/46 99 %   02/22/18 0700 - (!) 112 30 108/57 92 %   02/22/18 0600 - (!) 110 28 99/61 98 %   02/22/18 0500 - (!) 101 17 102/62 100 %   02/22/18 0400 97.7 °F (36.5 °C) (!) 104 25 102/58 98 %   02/22/18 0300 - (!) 104 20 98/55 99 %   02/22/18 0200 - 99 23 108/57 98 %         Intake and Output:  Current Shift: 02/22 0701 - 02/22 1900  In: 33.3 [I.V.:33.3]  Out: 140 [Urine:140]  Last three shifts: 02/20 1901 - 02/22 0700  In: 628.2 [P.O.:240; I.V.:388.2]  Out: 1493 [Urine:1535]    Physical Exam:   GENERAL: somnolent, but arousable, alert and oriented, no acute distress, fatigued appearing   HEENT: Normocephalic, atraumatic, PERRLA  LUNG: increased work of breathing, lungs clear to auscultation bilaterally  HEART: regular rate and rhythm, S1, S2 normal, no murmur, click, rub or gallop  ABDOMEN: soft, non-tender. Bowel sounds normal. No masses,  no organomegaly  EXTREMITIES:  Mild edema of bilateral lower extremities, otherwise extremities normal, distal pulses intact, atraumatic, no cyanosis  NEUROLOGIC: alert and oriented, moving extremities, following commands, good  strength,     Lab/Data Review: All lab results for the last 24 hours reviewed. BUN/Cr - 70/2.2  pTT - 79    2/21/2018  CK - 1164  CK-MB - 59.9  Trop - 45.3  BNP - 07781      CT Chest   Ct Chest Wo Cont    Result Date: 2/21/2018  IMPRESSION: 1. Multifocal groundglass opacities occurring in a background of relatively diffuse interlobular septal line thickening; findings in keeping with a combination of interstitial and alveolar edema. 2. Mild compressive atelectasis at each lung base related to moderate right and small left pleural effusions. 3. Mild cardiomegaly without pericardial effusion. 4. Upper pole left renal stones, with mild left-sided pelviectasis, incompletely evaluated.  Notably, prior abdominal pelvic CT 11/19/2009 demonstrated a staghorn calculus in the midportion of the left renal pelvis not demonstrated within the current field of view. Comparison with more recent imaging of the abdomen or pelvis recommended. 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 (2/20/2018)      Cheyne-Sanchez breathing (2/20/2018)      Acute cystitis (2/20/2018)      Acute renal failure ([facility] [address] (2/21/2018)        Plan:   NSTEM  - Troponin - 45.3   - Atorvastatin 40mg PO daily   - Nitroglycerin 400mcg/ml infusion   - ASA 81mg daily   - Continue cardiac monitoring   - patient is being followed by cardiology    Systolic heart failure   - Echo showed low ejection fraction 15-20%, severe wall motion abnormalities and decreased left ventricular function  - BNP 26,524; repeat BNP  - Digoxin 0.25 mg PO daily   - Metoprolol 25mg q6h     UTI  - Rocephin 1g q24h  - urine culture without growth at 2 days     Pulmonary edema  - Duo-neb 2.5mg/0.5mg PRN q4h  - CT chest showed evidence of interstitial and alveolar edema, as above    Central sleep apnea   - Use CPAP as needed     DVT prophylaxis   - Heparin 08090Y     Signed By: Shabbir Cohen     February 22, 2018          *ATTENTION:  This note has been created by a medical student for educational purposes only. Please do not refer to the content of this note for clinical decision-making, billing, or other purposes. Please see attending physicians note to obtain clinical information on this patient. *

## 2018-02-22 NOTE — ROUTINE PROCESS
0730 Bedside report given to Gabriella Hanna (oncoming nurse) per Yoel Spicer RN(offgoing nurse) utilizing SBAR, MAR, Kardex, I&O, results review, and EKG interpretation with rate and rhythm.

## 2018-02-22 NOTE — PROGRESS NOTES
Problem: Dysphagia (Adult)  Goal: *Acute Goals and Plan of Care (Insert Text)  Patient will:  1. Tolerate PO trials with 0 s/s overt distress in 4/5 trials  2. Utilize compensatory swallow strategies/maneuvers (decrease bite/sip, size/rate, alt. liq/sol) with min cues in 4/5 trials  3. Perform oral-motor/laryngeal exercises to increase oropharyngeal swallow function with min cues  4. Complete an objective swallow study (i.e., MBSS) to assess swallow integrity, r/o aspiration, and determine of safest LRD, min A    Recommendations:  Diet: mechanical soft solids, honey thick liquids  Meds: one at a time in puree  Aspiration Precautions  Other: small bites/ sips, up 90* for all po and at least 30 minutes following           Outcome: Not Met  Speech LAnguage Pathology bedside swallow evaluation  And dysphagia treatment    Patient: Leanne Badillo (81 y.o. male)  Date: 2/22/2018  Primary Diagnosis: NSTEMI (non-ST elevated myocardial infarction) ([facility] [address]        Precautions: aspiration       ASSESSMENT :  Based on the objective data described below, the patient presents with mild oropharyngeal dysphagia s/p coughing episode with liquids yesterday. Pt demo overt s/sx aspiration following thin and nectar, tolerated small straw sips honey, puree and mechanical soft solids. Pt with functional labial, slow lingual movements with lingual fasciculations, fair ROM, natural dentition. Mildly delayed swallow response (1-2 seconds) with fairly good laryngeal elevation. Gurgly vocal quality with delayed throat clear following thin, mildly delayed cough response following nectar. Rec: mechanical soft solids with honey thick liquids, aspiration precautions, up 90* for all po and at least 30 minutes following. Pt would benefit from MBS to r/o aspiration and determine swallow integrity. Orders written. SLP will f/u. Tx completed with trials nectar thick liquids via cup and honey thick liquids via straw.   Pt continued with gurgly vocal quality and mildly delayed cough response following nectar thick via cup. No overt s/sx aspiration following honey thick liquids via small straw sips. Educated pt to compensatory swallow strategies including: upright for all po, decreased rate of intake, small bites/ sips, alternate solids/ liquids. Pt verbalized understanding, ?level carry over? SLP will continue to follow. Patient will benefit from skilled intervention to address the above impairments. Patients rehabilitation potential is considered to be Good  Factors which may influence rehabilitation potential include:   []            None noted  []            Mental ability/status  [x]            Medical condition  []            Home/family situation and support systems  []            Safety awareness  []            Pain tolerance/management  []            Other:      PLAN :  Recommendations and Planned Interventions:  mechanical soft solids with honey thick liquids, aspiration precautions, up 90* for all po and at least 30 minutes following. Pt would benefit from MBS to r/o aspiration and determine swallow integrity. Orders written. SLP will f/u. Frequency/Duration: Patient will be followed by speech-language pathology 1-2 times per day/4-7 days per week to address goals. Discharge Recommendations: [place] Floyd and To Be Determined     SUBJECTIVE:   Patient stated I won a cupcake eating contest once. OBJECTIVE:     Past Medical History:   Diagnosis Date    Asbestosis(501)     Asthma     Chronic obstructive pulmonary disease ([facility] [address]     Hypertension    History reviewed. No pertinent surgical history.   Prior Level of Function/Home Situation: as per H&P  Home Situation  Home Environment: Apartment  One/Two Story Residence: Two story  Living Alone: No  Support Systems: Child(daniel), Family member(s)  Patient Expects to be Discharged to[de-identified] Apartment  Current DME Used/Available at Home: None  Diet prior to admission: regular/ thin per pt report  Current Diet:  NPO 2* coughing with liquids yesterday   Cognitive and Communication Status:  Neurologic State: Alert  Orientation Level: Oriented to person  Cognition: Follows commands  Perception: Appears intact  Perseveration: No perseveration noted  Safety/Judgement: Fall prevention  Oral Assessment:  Oral Assessment  Labial: Decreased rate  Dentition: Natural  Oral Hygiene: fair  Lingual: Decreased rate;Decreased strength  Velum: Unable to visualize  Mandible: No impairment  P.O. Trials:  Patient Position: HOB 50*  Vocal quality prior to P.O.: Hoarse;Low volume  Consistency Presented: Thin liquid; Solid;Pudding;Nectar thick liquid;Honey thick liquid  How Presented: Self-fed/presented;SLP-fed/presented;Cup/sip;Straw;Successive swallows     Bolus Acceptance: No impairment  Bolus Formation/Control: Impaired  Type of Impairment: Mastication  Propulsion: Delayed (# of seconds); Discoordination;Lingual tremors  Oral Residue: 10-50% of bolus; Lingual;Palatal  Initiation of Swallow: Delayed (# of seconds)  Laryngeal Elevation: Functional  Aspiration Signs/Symptoms: Change vocal quality;Clear throat;Delayed cough/throat clear;Weak cough  Pharyngeal Phase Characteristics: Altered vocal quality; Double swallow; Suspected pharyngeal residue;Poor endurance  Effective Modifications: Alternate liquids/solids;Small sips and bites  Cues for Modifications:  Moderate       Oral Phase Severity: Mild  Pharyngeal Phase Severity : Mild    GCODESwallowing:  Swallow Current Status CJ= 20-39%   Swallow Goal Status CH= 0%   Swallow D/C Status CH= 0%    The severity rating is based on the following outcomes:  FRANK Noms Swallow Level 4    Clinical Judgement    PAIN:  Start of Eval: 0  End of Eval: 0     After treatment:   []            Patient left in no apparent distress sitting up in chair  [x]            Patient left in no apparent distress in bed  [x]            Call bell left within reach  [x] Nursing notified  []            Family present  []            Caregiver present  []            Bed alarm activated    COMMUNICATION/EDUCATION:   [x]            Aspiration precautions; swallow safety; compensatory techniques. [x]            Patient/family have participated as able in goal setting and plan of care. []            Patient/family agree to work toward stated goals and plan of care. []            Patient understands intent and goals of therapy; neutral about participation. []            Patient unable to participate in goal setting/plan of care; educ ongoing with interdisciplinary staff  [x]         Posted safety precautions in patient's room.     Thank you for this referral.  Kerry Chiu MS, CCC/SLP  Eval: 13 minutes  Tx: 13 minutes

## 2018-02-22 NOTE — PROGRESS NOTES
Hospitalist Progress Note  Ezequiel Gallegos MD  Internal medicine/ Hospitalist    Daily Progress Note: 2/22/2018 5:15 PM      Interval history / Subjective:   Davion Rajan is a 81 y.o.  male with h/o asthma,copd,was to the emergency room because of short of breath. Patient is a poor historian and no family member at the time of my evaluation,so the history is mostly obtained from the ER attending. It was reported that was brought to the emergency room because of short of breath of several hours. Initially started on iv fluid and antibiotics for possible sepsis by the ER doctor who initially saw patient. When the replacing ER arrived and reviewed the cxr and lab results,it was noted that patient had elevated troponin at 10.20 and CXR showed pulmonary edema. Lasix iv was given and cardiology was consulted for NSTEMI. While talking to patient,he has reported having some chest pain but was not specific. On 2/21,bumex gtt d/c'marbin as creatinine went up to 2.18,pt will be on lasix prn. ACE-I/ARB on hold due to MAREK.       Current Facility-Administered Medications   Medication Dose Route Frequency    pantoprazole (PROTONIX) tablet 40 mg  40 mg Oral ACB    digoxin (LANOXIN) tablet 0.25 mg  0.25 mg Oral DAILY    sodium chloride (NS) flush 5-10 mL  5-10 mL IntraVENous PRN    heparin 25,000 units in D5W 250 ml infusion  12-25 Units/kg/hr IntraVENous TITRATE    acetaminophen (TYLENOL) tablet 500 mg  500 mg Oral Q6H PRN    aspirin delayed-release tablet 81 mg  81 mg Oral DAILY    sodium chloride (NS) flush 5-10 mL  5-10 mL IntraVENous Q8H    sodium chloride (NS) flush 5-10 mL  5-10 mL IntraVENous PRN    magnesium hydroxide (MILK OF MAGNESIA) 400 mg/5 mL oral suspension 30 mL  30 mL Oral DAILY PRN    docusate sodium (COLACE) capsule 100 mg  100 mg Oral BID    morphine injection 2 mg  2 mg IntraVENous Q4H PRN    nitroglycerin (NITROSTAT) tablet 0.4 mg  0.4 mg SubLINGual Q5MIN PRN    atorvastatin (LIPITOR) tablet 40 mg  40 mg Oral QHS    albuterol-ipratropium (DUO-NEB) 2.5 MG-0.5 MG/3 ML  3 mL Nebulization Q4H PRN    ondansetron (ZOFRAN) injection 4 mg  4 mg IntraVENous Q6H PRN    nitroglycerin (TRIDIL) 400 mcg/ml infusion  0-20 mcg/min IntraVENous TITRATE    metoprolol tartrate (LOPRESSOR) tablet 25 mg  25 mg Oral Q6H    cefTRIAXone (ROCEPHIN) 1 g in sterile water (preservative free) 10 mL IV syringe  1 g IntraVENous Q24H    metoprolol (LOPRESSOR) injection 2.5 mg  2.5 mg IntraVENous Q6H PRN        Review of Systems  Pt saying that he feels fair. Objective:     Visit Vitals    /61    Pulse (!) 111    Temp 97.4 °F (36.3 °C)    Resp (!) 31    Ht 5' 6\" (1.676 m)    Wt 66.1 kg (145 lb 11.6 oz)    SpO2 98%    BMI 23.52 kg/m2    O2 Flow Rate (L/min): 2 l/min O2 Device: Nasal cannula    Temp (24hrs), Av.7 °F (36.5 °C), Min:97.4 °F (36.3 °C), Max:97.9 °F (36.6 °C)       07 - 1900  In: 383.3 [P.O.:350; I.V.:33.3]  Out: 300 [Urine:300]  1901 -  0700  In: 628.2 [P.O.:240; I.V.:388.2]  Out: 6757 [MMRIH:7804]  P/E  NAD,looks lethargic today. Heent:perrla,at/nc,mouth moist.  Lungs crackles at the bases. Heart s1s2 nl,no m/g  Abdm:soft,not tender,bs present. Extr:trace edema,good pedis pulses. Neuro:Looks lethargic today. Slow to answer questions    Data Review    Recent Results (from the past 12 hour(s))   CBC W/O DIFF    Collection Time: 18  3:00 AM   Result Value Ref Range    WBC 10.3 4.6 - 13.2 K/uL    RBC 4.63 (L) 4.70 - 5.50 M/uL    HGB 13.7 13.0 - 16.0 g/dL    HCT 41.3 36.0 - 48.0 %    MCV 89.2 74.0 - 97.0 FL    MCH 29.6 24.0 - 34.0 PG    MCHC 33.2 31.0 - 37.0 g/dL    RDW 15.3 (H) 11.6 - 14.5 %    PLATELET 534 635 - 513 K/uL    MPV 11.7 9.2 - 11.8 FL   PTT    Collection Time: 18  3:00 AM   Result Value Ref Range    aPTT 79.0 (H) 23.0 - 08.7 SEC   METABOLIC PANEL, BASIC    Collection Time: 18  3:00 AM   Result Value Ref Range    Sodium 146 (H) 136 - 145 mmol/L    Potassium 4.3 3.5 - 5.5 mmol/L    Chloride 111 (H) 100 - 108 mmol/L    CO2 22 21 - 32 mmol/L    Anion gap 13 3.0 - 18 mmol/L    Glucose 107 (H) 74 - 99 mg/dL    BUN 70 (H) 7.0 - 18 MG/DL    Creatinine 2.20 (H) 0.6 - 1.3 MG/DL    BUN/Creatinine ratio 32 (H) 12 - 20      GFR est AA 35 (L) >60 ml/min/1.73m2    GFR est non-AA 29 (L) >60 ml/min/1.73m2    Calcium 8.3 (L) 8.5 - 10.1 MG/DL   NT-PRO BNP    Collection Time: 02/22/18  3:00 AM   Result Value Ref Range    NT pro-BNP 30531 (H) 0 - 1800 PG/ML         Assessment/Plan:     Principal Problem:    Heart failure, systolic, with acute decompensation (HCC) (2/20/2018)    Active Problems:    NSTEMI (non-ST elevated myocardial infarction) ([facility] [address] (2/20/2018)      Pulmonary edema (2/20/2018)      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 CHF,systolic with acute exacerbation:    -Pt received iv lasix in ER. Then started on bumex infusion.    -On 2/21 bumex gtt d/c'ed as creatinine went up. Now on prn lasix    -On BB,Digoxin. Nitrate gtt. ACE-I/ARB on hold    -CXR on 2/20:underexpanded lungs with progressive central vascular congestion and likely mild interstitial edema superimposed upon underlying pulmonary fibrotic change. -ECHO c/w 15 % to 20 %. There was severe hypokinesis / akinesis    -pro-bnp [id number] on admission    -Cardiology following    2. NSTEMI:    -Troponin:10.20 ->25.70 ->36.00    -On aspirin.    -On heparin gtt    -On BB. Nitrate drip    -ECHO as above    -Cardiology following    3. UTI:    -On rocephin    -Ucx and Blood cx NGTD    4. COPD    -On duo-neb    5. Sleep apnea    -Allow CPAP    DVT prophylaxis:on heparin  Full code  Surrogates:son and daughter  Disposition:tbd

## 2018-02-22 NOTE — ACP (ADVANCE CARE PLANNING)
GOALS OF CARE: Patient has capacity to complete his AMD-named his son Jcarlos Mcneill as [place] and elected to avoid heroics at EOL. He did sign his DDNR, stating he would not want INTUBATION even for a potentially reversible process, but would be willing to have BIPAP or other noninvasive measures and other aggressive care including PT/OT to get him able to return home. Copies of AMD and DDNR placed on chart, DNR/DNI entered into EMR. Son is aware that he likely will have SNF stay for rehab after hospital stay, son is not sure if he can care for both parents in his home, he may opt for long term NF care.

## 2018-02-22 NOTE — DIABETES MGMT
NUTRITIONAL ASSESSMENT / Edwina Shruti Figuracion           81 y.o.           2/20/2018                 1. NSTEMI (non-ST elevated myocardial infarction) ([address]    2. SOB (shortness of breath)    3. Acute systolic congestive heart failure (HCC)         INTERVENTIONS/PLAN:   1. Honey thick [place] Instant Breakfast BID  2. Implement preferences to optimize po intake. 3.  Monitor appetite status, labs and weights. ASSESSMENT:   Pt is 103% ideal wt; BMI (calculated): 23.5 kg/m2 (normal weight classification however pt appears thin). Improving po intake noted with pt taking >50% breakfast this morning. Nutrition Diagnoses:   Difficulty swallowing due to dysphagia as evidenced by SLP assessment/need for thickened liquids. SUBJECTIVE/OBJECTIVE:   Information obtained from: chart review, pt, ICU rounds  Pt reports his usual weight is 147# and it was stable PTA. No food allergies however pt is stating food preferences. Pt admitted with Heart failure, systolic, with acute decompensation, pulmonary edema, acute cystitis, ARF. PMHx includes:  COPD, HTN, asbestosis. Chart review indicates pt seen in [facility] ED 2/14/18 for choking on a cupcake. Pt with gluteal tear per nsg notes. Diet: mechanical soft, cardiac, honey thick liquids, 1500 ml FR/day; po intake at breakfast - 300 calories, 14 grams protein.     Patient Vitals for the past 100 hrs:   % Diet Eaten   02/22/18 1000 70 %   02/21/18 0800 50 %   02/20/18 1328 25 %     Medications: [x]                Reviewed     Most Recent POC Glucose: Recent Labs      02/22/18   0300  02/21/18   0825  02/20/18   1803  02/20/18   1045   GLU  107*  120*  135*  137*      Labs:   Lab Results   Component Value Date/Time    Hemoglobin A1c 5.6 02/20/2018 06:04 AM     Lab Results   Component Value Date/Time    Sodium 146 (H) 02/22/2018 03:00 AM    Potassium 4.3 02/22/2018 03:00 AM    Chloride 111 (H) 02/22/2018 03:00 AM    CO2 22 02/22/2018 03:00 AM Anion gap 13 02/22/2018 03:00 AM    Glucose 107 (H) 02/22/2018 03:00 AM    BUN 70 (H) 02/22/2018 03:00 AM    Creatinine 2.20 (H) 02/22/2018 03:00 AM    Calcium 8.3 (L) 02/22/2018 03:00 AM    Magnesium 2.1 02/20/2018 10:45 AM    Phosphorus 4.0 02/20/2018 10:45 AM    Albumin 3.3 (L) 02/20/2018 10:45 AM       Anthropometrics: IBW : 64.4 kg (142 lb), % IBW (Calculated): 102.62 %, BMI (calculated): 23.5  Wt Readings from Last 1 Encounters:   02/21/18 66.1 kg (145 lb 11.6 oz)    Ht Readings from Last 1 Encounters:   02/21/18 5' 6\" (1.676 m)       Estimated Nutrition Needs:  1967 Kcals/day, Protein (g): 86 g Fluid (ml): 2000 ml  Based on:   [x]          Actual BW    []          ABW   []            Adjusted BW           Nutrition Interventions:  Honey thick [place] Instant Breakfast BID  Implement preferences. Goal:   Pt will consume >75% meals by 2/27/18. Weight maintenance (+/- 1-2 kg) by 3/3/18.      Nutrition Monitoring and Evaluation      [x]     Monitor po intake on meal rounds  [x]     Continue inpatient monitoring and intervention  []     Other:      Nutrition Risk:  []   High     [x]  Moderate    []  Minimal/Uncompromised    Dixie Simons RD, CDE   Office:  [address] [place] [address] Pager:  [phone]

## 2018-02-22 NOTE — PROGRESS NOTES
0750-Received pt resting in bed with eyes closed, easily awakened, no sign of distress, vs stable on heparin and nitro drip, calm and cooperative, will continue to monitor  0900-Pt seen by speech therapy, new diet ordered for mechanical soft and honey thickened liquids  1000-Sitting up in bed, eating breakfast, assisted by CNA, tolerating well  1300-Titrating down on Nitro drip, pt complains of small amount of nausea, unchanged from this AM  1540-Pt and family seen by palliative care team, awaiting new orders, Nitro drip off  1730-Family at bedside, nitro drip off, tolerating well, denies nausea

## 2018-02-22 NOTE — PROGRESS NOTES
Problem: Falls - Risk of  Goal: *Absence of Falls  Document Silvio Fall Risk and appropriate interventions in the flowsheet.    Outcome: Progressing Towards Goal  Fall Risk Interventions:  Mobility Interventions: Bed/chair exit alarm         Medication Interventions: Bed/chair exit alarm    Elimination Interventions: Bed/chair exit alarm, Call light in reach, Patient to call for help with toileting needs

## 2018-02-22 NOTE — PROGRESS NOTES
[facility] [place] [facility] [place]: [phone] ([phone])  KRYSTIN CALL [facility]: [phone]   [facility]: [phone]    Patient Name: Daniel Araya  YOB: 1935    Date of Initial Consult: 2-21-18  Reason for Consult: Care Decisions  Requesting Provider: Moni Ramos MD   Primary Care Physician: None      SUMMARY:   Daniel Araya is a 81y.o. year old, with a past history of HTN, HLD, Sarcoid, ASTHMA/COPD, Asbestosis, who was admitted on 2/20/2018 from ER/Home with a diagnosis of SOB-Acute on Chronic. Current medical issues leading to Palliative Medicine involvement include: patient did r/o in for NSTEMI, has EF of 15-20%, he also has MAREK on hx of staghorn renal calculi and underlying pulmonary disease, asked to support pt/family to establish goals of care. PALLIATIVE DIAGNOSES:   1. Advance care planning  2. Debility  3. SOB  4. CHF  5. Acute Pulm Edema  6. MAREK       PLAN:   1. Met with patient at bedside-was being fed breakfast by CNA-denies CP/SOB but complains of some low back ache from being in bed. He is alert-knows his name/age/month/hospital and that he had breathing issue due to a weak heart. He shared that he is , 56 yrs, has 2 sons, 2 grandchildren, from 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 2. Advance Care Planning-patient has never done an AMD-but says he would want his wife and son Bri Camejo to share in his decision making if he could not participate. He has never thought about what he would want nor talked to his family about it. Will call his family to see if can set up time to meet and talk together. Spoke with Balbir-he shared that Sheela Pizano is not able to participate in care discussions, she is a [facility] participant and has limited intellectual capacity. He will meet with me at 3:00pm today.  Explained to son and patient all of his findings(below)-son shared that patient had been doing poorly over the last 4 months. 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 Call placed to [facility] to advise of this change. 3. Debility-that he was having trouble with his memory-would ask same questions, was unable to hold his urine and was now using depends, had poor hygiene and off balance despite using a walker he has had several falls. He was also with poor appetite-lost 5 lb, thought he was having some trouble swallowing and was overall quite fatigued. 4. SOB-improved now-but is due to underlying CAD/CHF and may not return to his prior baseline. Has hx of Asbestosis and Asthma/COPD but could not locate any medical w/u or PFTS to quantitate damage. Pulm reviewed CT Chest and believe most c/w fluid overload-PULM EDEMA due to CHF. 5. CHF-no prior hx of CAD and prior ECHO of 2013 showed EF 60% and now 15-20%, so unless this improves he has poor px.  6. MAREK-2015 Creat was 1.14 and on admission it was 1.15, now risen to 2.2, CT of chest shows a staghorn calculus and scheduled for US. Concern this will limit future w/u using contrast.   7. Initial consult note routed to primary continuity provider  8. Communicated plan of care with: Palliative IDT    GOALS OF CARE: Patient has capacity to complete his AMD-named his son Zach Roberts as [place] and elected to avoid heroics at EOL. He did sign his DDNR, stating he would not want INTUBATION even for a potentially reversible process, but would be willing to have BIPAP or other noninvasive measures and other aggressive care to get him able to return home. Son is aware that he likely will have SNF stay for rehab after hospital stay, son is not sure if he can care for both parents in his home, he may opt for long term NF care.          TREATMENT PREFERENCES:   Code Status: DNR    Advance Care Planning:  Advance Care Planning 2/21/2018   Patient's Healthcare Decision Maker is: Legal Next of Kin   Primary Decision Maker Name Linh Rincon   Primary Decision Maker Phone Number [phone]   Confirm Advance Directive None     Alber Call Spouse [phone]        Neil Tina [phone]     Ab Erickson [phone]               Other Instructions: Other:  As far as possible, the palliative care team has discussed with patient / health care proxy about goals of care / treatment preferences for patient. HISTORY:     History obtained from: Chart  patient used a walker to assist with ambulation  CHIEF COMPLAINT: SOB    HPI/SUBJECTIVE:  97.7, 105, 99/45, 20 on 2L at 99% wt 145lb. Wyn[address] 1155, Stooled 2-20  MAR-Nebs, ASA, Lipitor, Rocephin, Digoxin, Colace, Heparin, Lopressor, Protonix, NTG IV, Zofran prn-dose on 2-21, Morphine IV 2mg Q 4hr-PRN-NONE REC'D  LABS-WBC 10.3, H&H 13.7 & 41.3, Plat 244, BUN/Creat 34/1.15->40/1.59->61/2.18, Albumin 3.5, Trop 10->50->45, BNP 05768, HgbA1c 5.6, TSH 1.50, Blood & Urine C&S NGTD. INR 1.2,  CT Chest-1. Multifocal groundglass opacities occurring in a background of relatively  diffuse interlobular septal line thickening; findings in keeping with a  combination of interstitial and alveolar edema.     2. Mild compressive atelectasis at each lung base related to moderate right and  small left pleural effusions.     3. Mild cardiomegaly without pericardial effusion.     4. Upper pole left renal stones, with mild left-sided pelviectasis, incompletely  evaluated. Notably, prior abdominal pelvic CT 11/19/2009 demonstrated a staghorn  calculus in the midportion of the left renal pelvis not demonstrated within the  current field of view. Comparison with more recent imaging of the abdomen or  pelvis recommended. 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 ECHO-Left ventricle: The ventricle was mildly dilated.  Systolic function was   severely reduced by visual assessment. Ejection  fraction was estimated in the range of 15 % to 20 %. There was severe   hypokinesis / akinesis of the mid-apical  anterior, mid anteroseptal, mid inferoseptal, mid-apical inferior, mid   inferolateral, mid anterolateral, apical septal,  apical lateral, and apical wall(s). Only basilar segment seems to be   nilesh. 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 Takotsubo  cardiomyopathy. The study was not technically sufficient to allow evaluation   of LV diastolic function. Right ventricle: The ventricle was mildly dilated. Systolic function was   reduced. Estimated peak pressure was in the  range of 45 mmHg to 50 mmHg. Left atrium: The atrium was dilated. Mitral valve: There was moderate regurgitation. Aortic valve: The valve was trileaflet. Leaflets exhibited calcification and   normal cuspal separation. Calcified  echodensity noted on right coronary cust. There was no stenosis. There was   moderate regurgitation. Tricuspid valve: The findings were consistent with mild tricuspid stenosis. Inferior vena cava, hepatic veins: The inferior vena cava was dilated. The   respirophasic change in diameter was less  than 50%. No acute intracranial abnormalities  Cardio Consult-Pt lying flat without difficulty. He has had minimal diuresis, only -168 ml s/p 40 mg IV x 3. His Bumex gtts was not started until ~06:00 this AM and subsequently d/c by intensivist.  Pt to get dry CT chest today to evaluate for underlying pulmonary disease.    -Will give Digoxin 250 mcg IV once now, then start on PO Digoxin tomorrow AM.  -Continue ASA, Lipitor, Lopressor.  -Continue Heparin gtts and nitroglycerin gtts. -Discussed ischemic workup/cardiac catheterization with patient's son, he needs time to think about cath. PULM-Case discussed with Dr. Jose G Maguire. Will stop Bumex gtt. Furosemide PRN. Chest CT WITHOUT contrast today (Dx: dyspnea).   Titrate Tridil for chest pain and nausea (as I believe this is his anginal equivalent). Monitor renal function. On empiric Rocephin for urinary tract infection. Follow up urine cultures. On aspirin, nitroglycerin, metoprolol, atorvastatin. ACE-I/ARB on hold with rising creatinine. Renal dosing of medications. Glucose control: glucose stabilization per ICU protocol  The patient is:   [x] Verbal and participatory  [] Non-participatory due to:       Clinical Pain Assessment (nonverbal scale for nonverbal patients): Clinical Pain Assessment  Severity: 0   Denies pain/sob  Activity (Movement): Lying quietly, normal position    Duration: for how long has pt been experiencing pain (e.g., 2 days, 1 month, years)  Frequency: how often pain is an issue (e.g., several times per day, once every few days, constant)     FUNCTIONAL ASSESSMENT:     Palliative Performance Scale (PPS):40%       ECOG        PSYCHOSOCIAL/SPIRITUAL SCREENING:      Any spiritual / Jehovah's witness concerns:  [] Yes /  [x] No    Caregiver Burnout:  [] Yes /  [x] No /  [] No Caregiver Present      Anticipatory grief assessment:   [x] Normal  / [] Maladaptive        REVIEW OF SYSTEMS:     Positive and pertinent negative findings in ROS are noted above in HPI. Has had SOB at home, and quite marked decrease in energy. Appetite as well poor and some trouble swallowing, had fall as well and urine incontinence. The following systems were [x] reviewed / [] unable to be reviewed as noted in HPI  Other findings are noted below. Systems: constitutional, ears/nose/mouth/throat, respiratory, gastrointestinal, genitourinary, musculoskeletal, integumentary, neurologic, psychiatric, endocrine. Positive findings noted below.   Modified ESAS Completed by: provider   Fatigue: 4 Drowsiness: 4   Depression: 0 Pain: 0   Anxiety: 0 Nausea: 0   Anorexia: 4 Dyspnea: 4     Constipation: No              PHYSICAL EXAM:     Wt Readings from Last 3 Encounters:   02/22/18 66 kg (145 lb 8.1 oz)   01/15/18 79.4 kg (175 lb)   07/27/14 78.9 kg (174 lb)     Blood pressure 104/57, pulse (!) 108, temperature 97.8 °F (36.6 °C), resp. rate (!) 33, height 5' 6\" (1.676 m), weight 66 kg (145 lb 8.1 oz), SpO2 99 %. Pain:  Pain Scale 1: Numeric (0 - 10)  Pain Intensity 1: 0                 Last bowel movement: 2-20    Constitutional: older man-somewhat unkempt-long and dirty finger nails, wearing oxygen and at times appeared to be sob  Eyes: pupils equal, anicteric, wears glasses-appears to have dysconjugate gaze  ENMT: no nasal discharge, moist mucous membranes, edentulous and hard to understand  Respiratory: breathing at times labored, symmetric  Gastrointestinal: soft non-tender, +bowel sounds  Musculoskeletal: no deformity, no tenderness to palpation  Skin: warm, dry  Neurologic: following commands, moving all extremities  Psychiatric: full affect, no hallucinations  Other:       HISTORY:     Principal Problem:    Heart failure, systolic, with acute decompensation (HCC) (2/20/2018)    Active Problems:    NSTEMI (non-ST elevated myocardial infarction) ([facility] [address] (2/20/2018)      Pulmonary edema (2/20/2018)      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 No pertinent surgical history. History reviewed. No pertinent family history. History reviewed, no pertinent family history.   Social History   Substance Use Topics    Smoking status: Never Smoker    Smokeless tobacco: Never Used    Alcohol use No     No Known Allergies   Current Facility-Administered Medications   Medication Dose Route Frequency    pantoprazole (PROTONIX) tablet 40 mg  40 mg Oral ACB    digoxin (LANOXIN) tablet 0.25 mg  0.25 mg Oral DAILY    sodium chloride (NS) flush 5-10 mL  5-10 mL IntraVENous PRN    heparin 25,000 units in D5W 250 ml infusion  12-25 Units/kg/hr IntraVENous TITRATE    acetaminophen (TYLENOL) tablet 500 mg  500 mg Oral Q6H PRN    aspirin delayed-release tablet 81 mg  81 mg Oral DAILY    sodium chloride (NS) flush 5-10 mL  5-10 mL IntraVENous Q8H    sodium chloride (NS) flush 5-10 mL  5-10 mL IntraVENous PRN    magnesium hydroxide (MILK OF MAGNESIA) 400 mg/5 mL oral suspension 30 mL  30 mL Oral DAILY PRN    docusate sodium (COLACE) capsule 100 mg  100 mg Oral BID    morphine injection 2 mg  2 mg IntraVENous Q4H PRN    nitroglycerin (NITROSTAT) tablet 0.4 mg  0.4 mg SubLINGual Q5MIN PRN    atorvastatin (LIPITOR) tablet 40 mg  40 mg Oral QHS    albuterol-ipratropium (DUO-NEB) 2.5 MG-0.5 MG/3 ML  3 mL Nebulization Q4H PRN    ondansetron (ZOFRAN) injection 4 mg  4 mg IntraVENous Q6H PRN    nitroglycerin (TRIDIL) 400 mcg/ml infusion  0-20 mcg/min IntraVENous TITRATE    metoprolol tartrate (LOPRESSOR) tablet 25 mg  25 mg Oral Q6H    cefTRIAXone (ROCEPHIN) 1 g in sterile water (preservative free) 10 mL IV syringe  1 g IntraVENous Q24H    metoprolol (LOPRESSOR) injection 2.5 mg  2.5 mg IntraVENous Q6H PRN        LAB AND IMAGING FINDINGS:     Lab Results   Component Value Date/Time    WBC 10.3 02/22/2018 03:00 AM    HGB 13.7 02/22/2018 03:00 AM    PLATELET 524 87/25/4503 03:00 AM     Lab Results   Component Value Date/Time    Sodium 146 (H) 02/22/2018 03:00 AM    Potassium 4.3 02/22/2018 03:00 AM    Chloride 111 (H) 02/22/2018 03:00 AM    CO2 22 02/22/2018 03:00 AM    BUN 70 (H) 02/22/2018 03:00 AM    Creatinine 2.20 (H) 02/22/2018 03:00 AM    Calcium 8.3 (L) 02/22/2018 03:00 AM    Magnesium 2.1 02/20/2018 10:45 AM    Phosphorus 4.0 02/20/2018 10:45 AM      Lab Results   Component Value Date/Time    AST (SGOT) 238 (H) 02/20/2018 10:45 AM    Alk.  phosphatase 90 02/20/2018 10:45 AM    Protein, total 7.1 02/20/2018 10:45 AM Albumin 3.3 (L) 02/20/2018 10:45 AM    Globulin 3.8 02/20/2018 10:45 AM     Lab Results   Component Value Date/Time    INR 1.2 02/20/2018 10:45 AM    Prothrombin time 14.6 02/20/2018 10:45 AM    aPTT 79.0 (H) 02/22/2018 03:00 AM      No results found for: IRON, FE, TIBC, IBCT, PSAT, FERR   No results found for: PH, PCO2, PO2  No components found for: [place]   Lab Results   Component Value Date/Time    CK 1164 (H) 02/21/2018 08:25 AM    CK - MB 59.9 (H) 02/21/2018 08:25 AM              Total time: 120  Counseling / coordination time, spent as noted above: 90 minutes  > 50% counseling / coordination: yes with patient, son  Prolonged service was provided for  [x]30 min   []75 min in face to face time in the presence of the patient, spent as noted above. Time Start: 9:30amTime End: 10am Time start 3:00pm Time End 4:00pm  Note: this can only be billed with  (initial) or 98639 (follow up). If multiple start / stop times, list each separately.

## 2018-02-22 NOTE — PROGRESS NOTES
Discussed with Dr Jeaneth Hendrix. He stated he is considering palliative care consult however he is delaying at present time while the patients medical condition stabilizes. Family will consider SNF for rehab  if the patient is able to participate. Patient currently unable to participate secondary to his medical condition. Case mansgement will continue to follow .  Varsha Maldonado RN

## 2018-02-22 NOTE — PROGRESS NOTES
1930 Assumed care of pt after bedside report with pt lying in bed with HOB elevated. Neuro intact. AAO x4. IVAN x 4 but weakness. Monitor denotes ST without ectopy. Lungs coarse, crackles. Pt with nonproductive cough. Abd intact, soft. Denies c/o chest pain, SOB, nausea, vomiting. Wynne catheter in place and draining meera urine. Heparin and NTG drips infusing as ordered. Pt NPO for now due to earlier swallowing problems during the day. 2200 Pt status unchanged. 02/22/2018 0000 Resting without c/o SOB or chest pain. 0300 AM labs drawn and sent to lab. 0600 Resting without complaints. Heparin drip remains at current rate as per PTT.

## 2018-02-22 NOTE — PROGRESS NOTES
[facility]  -  Progress Note      Patient: Leanne Badillo MRN: [id number]  SSN: [id number]    YOB: 1935  Age: 81 y.o. Sex: male      Admit Date: 2/20/2018    Patient seen and examined independently. Will reevaluate for diuretics in AM. Possible stress cardiomyopathy vs ischemic etiology as noted. Plan is for medical stabilization. Agree with assessment and plan as noted below. Tu Paredes MD    Assessment:     -Respiratory distress, tachypnic and tachycardic, ? Multifactorial with CHF, NSTEMI and underlying pulmonary disease  -NSTEMI, troponin 10.2>25.7>36>50.9>45.3, lateral ST depression, on Heparin gtts, ASA, Lipitor, Lopressor, nitro gtts  - Cardiomyopathy: LVEF 15-20% with RWMA. Takotsubo vs multivessel CAD  -CT 02/21/2018: Multifocal groundglass opacities occurring in a background of relatively diffuse interlobular septal line thickening, consistent with combination of interstitial and alveolar edema. Mild compressive atelectasis at each lung base related to moderate R and small L pleural effusions. Mild cardiomegaly w/o pericardial effusion. -COPD  -Sarcoidosis  -Asthma  -HTN    Plan:     -Continue current management with ASA, Lipitor, DIgoxin, Lopressor.  -Continue Heparin gtts and nitro gtts. -Rates seem to be rising to 110's, will give dose of IV Digoxin due to borderline BP. Subjective:     No new complaints.       Objective:      Patient Vitals for the past 8 hrs:   Temp Pulse Resp BP SpO2   02/22/18 0600 - (!) 110 28 99/61 98 %   02/22/18 0500 - (!) 101 17 102/62 100 %   02/22/18 0400 97.7 °F (36.5 °C) (!) 104 25 102/58 98 %   02/22/18 0300 - (!) 104 20 98/55 99 %   02/22/18 0200 - 99 23 108/57 98 %   02/22/18 0100 - 97 19 108/60 99 %   02/22/18 0000 97.8 °F (36.6 °C) 100 (!) 33 99/58 100 %         Patient Vitals for the past 96 hrs:   Weight   02/21/18 1029 145 lb 11.6 oz (66.1 kg)   02/20/18 1008 148 lb 2.4 oz (67.2 kg)   02/20/18 0544 150 lb (68 kg) Intake/Output Summary (Last 24 hours) at 02/22/18 0736  Last data filed at 02/22/18 0600   Gross per 24 hour   Intake           386.25 ml   Output             1155 ml   Net          -768.75 ml       Physical Exam:  General:  alert, cooperative, no distress, appears stated age  Lungs: Inspiratory rales to posterior lung field  Heart:  Tachycardic irregular rhythm  Abdomen:  abdomen is soft without significant tenderness, masses, organomegaly or guarding  Extremities:  no edema     Data Review:     Labs: Results:       Chemistry Recent Labs      02/21/18   0825  02/20/18   1803  02/20/18   1045  02/20/18   0604   GLU  120*  135*  137*  111*   NA  146*  148*  147*  148*   K  4.4  4.3  4.1  3.9   CL  112*  115*  115*  115*   CO2  17*  19*  20*  21   BUN  61*  40*  36*  34*   CREA  2.18*  1.59*  1.18  1.15   CA  8.8  8.9  8.7  9.1   MG   --    --   2.1   --    PHOS   --    --   4.0   --    AGAP  17  14  12  12   BUCR  28*  25*  31*  30*   AP   --    --   90  84   TP   --    --   7.1  7.7   ALB   --    --   3.3*  3.5   GLOB   --    --   3.8  4.2*   AGRAT   --    --   0.9  0.8      CBC w/Diff Recent Labs      02/22/18   0300  02/21/18   1030  02/20/18   1045  02/20/18   0607   WBC  10.3  10.3  7.9  8.1   RBC  4.63*  4.57*  4.19*  4.13*   HGB  13.7  13.5  12.2*  12.2*   HCT  41.3  40.9  37.2  36.7   PLT  244  251  238  217   GRANS   --    --    --   87*   LYMPH   --    --    --   9*   EOS   --    --    --   0      Cardiac Enzymes Lab Results   Component Value Date/Time    CPK 1164 (H) 02/21/2018 08:25 AM    CKMB 59.9 (H) 02/21/2018 08:25 AM    CKND1 5.1 (H) 02/21/2018 08:25 AM    TROIQ 45.30 (HH) 02/21/2018 08:25 AM      Coagulation Recent Labs      02/22/18   0300  02/21/18   1030   02/20/18   1045   PTP   --    --    --   14.6   INR   --    --    --   1.2   APTT  79.0*  75.6*   < >   --     < > = values in this interval not displayed.        Lipid Panel Lab Results   Component Value Date/Time    Cholesterol, total 140 02/21/2018 03:00 AM    HDL Cholesterol 57 02/21/2018 03:00 AM    LDL,Direct 111 (H) 05/18/2010 02:21 PM    LDL, calculated 66.6 02/21/2018 03:00 AM    VLDL, calculated 16.4 02/21/2018 03:00 AM    Triglyceride 82 02/21/2018 03:00 AM    CHOL/HDL Ratio 2.5 02/21/2018 03:00 AM      Liver Enzymes Recent Labs      02/20/18   1045   TP  7.1   ALB  3.3*   AP  90   SGOT  238*      Thyroid Studies Lab Results   Component Value Date/Time    TSH 1.50 02/20/2018 10:45 AM

## 2018-02-22 NOTE — PROGRESS NOTES
[facility]   Pulmonary, Critical Care, and Sleep Medicine     Critical Care Progress Note    Name: Giovanni Boyce   : 1935   MRN: [id number]   Date: 2018                                 [x]I have reviewed the flowsheet and previous days notes. Events, vitals, medications and notes from last 24 hours reviewed. Care plan discussed on multidisciplinary rounds. [] The patient is unable to give any meaningful history or review of systems because the patient is:  [] Intubated [] Sedated   [] Unresponsive []      [x]The patient is critically ill on      [] Mechanical ventilation [] Vasoactive agents   [x] BiPAP [] Inotropes                 SUBJECTIVE  - This 81 y.o. [place] male nonsmoker was originally on 2018 seen in consultation at the request of Dr. Mariela Pineda for recommendations on further evaluation and management of respiratory distress. The patient presented to the ER with complaints of increasing dyspnea over the 2 days prior to presentation. He also endorsed chest pain. He reports that he is a lifetime nonsmoker but apparently carries a history of COPD. In the ER, the patient was diagnosed with NSTEMI and acute systolic heart failure. He was started on sepsis bundle and got about 1500 mL of IV fluid resuscitation and got a dose of levofloxacin. He remained tachycardic and continued to have \"labored\" respirations and was placed on BiPAP support. At the time of clinical interview, the patient has come up to the ICU and is on BiPAP. FiO2 25%. SpO2 97%. -120. RR 0-40, classic Cheynes-Sanchez pattern.     He was evaluated by Cardiology in the ER and was started on aspirin and heparin gtt. He has also been started on low-dose beta blockade. He was apparently given a single dose of furosemide. There is some confusion about the exact dose, as it appears 40 mg ordered but 20 mg administered.  In any case, only a modest diuretic response has been observed with ~ 50 mL/hr. He apparently had some nausea and vomiting this am. At present, he denies nausea or vomiting. He endorses intermittent chest pain. - Overnight events: No nausea. No emesis. Denies chest pain. On Tridil gtt. Started Bumex gtt overnight. U/O ~70 mL/hr. Denies orthopnea or paroxysmal nocturnal dyspnea. [x] Nutrition: cardiac  [] BM today. ROS: A comprehensive review of systems was negative except for that written in the HPI.     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)     Asthma     Chronic obstructive pulmonary disease ([facility] [address]     Hypertension         Allergy:  No Known Allergies       Current Facility-Administered Medications:     pantoprazole (PROTONIX) tablet 40 mg, 40 mg, Oral, ACB, Paresh Mancera MD, 40 mg at 02/22/18 1102    digoxin (LANOXIN) tablet 0.25 mg, 0.25 mg, Oral, DAILY, Alyssa Rai PA-C, 0.25 mg at 02/22/18 2252    sodium chloride (NS) flush 5-10 mL, 5-10 mL, IntraVENous, PRN, Susana López MD    heparin 25,000 units in D5W 250 ml infusion, 12-25 Units/kg/hr, IntraVENous, TITRATE, Jackelyn Torres DO, Last Rate: 8.8 mL/hr at 02/22/18 0731, 13 Units/kg/hr at 02/22/18 0731    acetaminophen (TYLENOL) tablet 500 mg, 500 mg, Oral, Q6H PRN, Paresh Mancera MD    aspirin delayed-release tablet 81 mg, 81 mg, Oral, DAILY, Paresh Mancera MD, 81 mg at 02/22/18 0824    sodium chloride (NS) flush 5-10 mL, 5-10 mL, IntraVENous, Q8H, Paresh Mancera MD, 10 mL at 02/22/18 9898    sodium chloride (NS) flush 5-10 mL, 5-10 mL, IntraVENous, PRN, Paresh Mancera MD    magnesium hydroxide (MILK OF MAGNESIA) 400 mg/5 mL oral suspension 30 mL, 30 mL, Oral, DAILY PRN, Paresh Mancera MD    docusate sodium (COLACE) capsule 100 mg, 100 mg, Oral, BID, Paresh Mancera MD, 100 mg at 02/22/18 0824    morphine injection 2 mg, 2 mg, IntraVENous, Q4H PRN, Paresh Mancera MD    nitroglycerin (NITROSTAT) tablet 0.4 mg, 0.4 mg, SubLINGual, Q5MIN PRN, Estefany Foster MD    atorvastatin (LIPITOR) tablet 40 mg, 40 mg, Oral, QHS, Estefany Foster MD, Stopped at 18 2200    albuterol-ipratropium (DUO-NEB) 2.5 MG-0.5 MG/3 ML, 3 mL, Nebulization, Q4H PRN, Estefany Foster MD, Stopped at 18 0838    ondansetron (ZOFRAN) injection 4 mg, 4 mg, IntraVENous, Q6H PRN, Estefany Foster MD, 4 mg at 18 1359    nitroglycerin (TRIDIL) 400 mcg/ml infusion, 0-20 mcg/min, IntraVENous, TITRATE, Jordan Padilla MD, Last Rate: 2.3 mL/hr at 18 0733, 15 mcg/min at 18 0733    metoprolol tartrate (LOPRESSOR) tablet 25 mg, 25 mg, Oral, Q6H, Jordan Padilla MD, Stopped at 18 0000    cefTRIAXone (ROCEPHIN) 1 g in sterile water (preservative free) 10 mL IV syringe, 1 g, IntraVENous, Q24H, Jordan Padilla MD, 1 g at 18 1422    metoprolol (LOPRESSOR) injection 2.5 mg, 2.5 mg, IntraVENous, Q6H PRN, Alyssa Rai PA-C      OBJECTIVE  Vital Signs:    Visit Vitals    BP 99/46    Pulse (!) 105    Temp 97.7 °F (36.5 °C)    Resp 20    Ht 5' 6\" (1.676 m)    Wt 66.1 kg (145 lb 11.6 oz)    SpO2 99%    BMI 23.52 kg/m2       O2 Device: Nasal cannula   O2 Flow Rate (L/min): 2 l/min   Temp (24hrs), Av.7 °F (36.5 °C), Min:97.6 °F (36.4 °C), Max:97.9 °F (36.6 °C)       PHYSICAL EXAM:   General: alert, awake and oriented to time, person, place. Cooperative. No acute distress. Head: atraumatic, normocephalic  Eye: OD blindness, no scleral icterus, no pallor, no cyanosis  Nose: Nares are patent. No polyps. No exudate. No sinus tenderness. Throat: No oral thrush. No exudate. Mucous membranes are moist. No tonsillar enlargement. Neck: supple, no thyromegaly. JVD present. No lymphadenopathy. Lung: Symmetric in development and expansion. Good air entry. Decreased bilateral, lower-lung predominant crackles. No wheezes. Heart: Regular S1, S2 without murmur, rub or gallop. Tachycardic. Abdomen: soft, nontender, nondistended. Normoactive bowel sounds. No rebound. No guarding. Extremities: no pedal edema, no cyanosis, no clubbing, 2+ peripheral pulses in DP  Lymphatic: no cervical/axillary/inguinal lymphadenopathy  Neurologic: Cranial nerves II-XII are grossly symmetric and physiologic. Babinski negative. No sensory deficit. No motor deficit. [email or web address]. No cerebellar signs. Gait was not assessed. Skin: No rash or lesion. DATA:   CVP: - mmHg  SvO2 - % from distal port of CVC    Intake/Output:   Last shift:      02/22 0701 - 02/22 1900  In: -   Out: 140 [Urine:140]    Last 3 shifts: 02/20 1901 - 02/22 0700  In: 628.2 [P.O.:240; I.V.:388.2]  Out: 1535 [Urine:1535]      Intake/Output Summary (Last 24 hours) at 02/22/18 0856  Last data filed at 02/22/18 0800   Gross per 24 hour   Intake           251.95 ml   Output             1275 ml   Net         -1023.05 ml       Last 3 Recorded Weights in this Encounter    02/20/18 0544 02/20/18 1008 02/21/18 1029   Weight: 68 kg (150 lb) 67.2 kg (148 lb 2.4 oz) 66.1 kg (145 lb 11.6 oz)     Lines: All central lines examined by me. No signs of erythema, induration, discharge. Peripheral Intravenous Line:  Peripheral IV 02/20/18 Right Antecubital (Active)   Site Assessment Clean, dry, & intact 2/22/2018  6:00 AM   Phlebitis Assessment 0 2/22/2018  6:00 AM   Infiltration Assessment 0 2/22/2018  6:00 AM   Dressing Status Clean, dry, & intact 2/22/2018  6:00 AM   Dressing Type Transparent 2/22/2018  6:00 AM   Hub Color/Line Status Green; Infusing 2/22/2018  6:00 AM   Action Taken Open ports on tubing capped 2/22/2018  6:00 AM   Alcohol Cap Used Yes 2/22/2018  6:00 AM     Telemetry: [x]Sinus []A-flutter []Paced    []A-fib []Multiple PVCs     Imaging:  [x] I have personally reviewed the patients radiographs  [] Radiographs reviewed with radiologist  [] No CXR study available for review today  [] No change from prior, tubes and lines are in adequate position  [] Improved   [x] Worsening    CXR: (2/21) asymmetrical pulmonary edema versus pneumonia versus ARDS with progression on  the right. Cannot exclude minimal bilateral pleural effusions.     CT (2/21, chest): multifocal groundglass opacities occurring in a background of relatively diffuse interlobular septal line thickening; mild compressive atelectasis at each lung base related to moderate right and small left pleural effusions; mild cardiomegaly without pericardial effusion; upper pole left renal stones, with mild left-sided pelviectasis                  Labs:  Recent Results (from the past 24 hour(s))   PTT    Collection Time: 02/21/18 10:30 AM   Result Value Ref Range    aPTT 75.6 (H) 23.0 - 36.4 SEC   CBC W/O DIFF    Collection Time: 02/21/18 10:30 AM   Result Value Ref Range    WBC 10.3 4.6 - 13.2 K/uL    RBC 4.57 (L) 4.70 - 5.50 M/uL    HGB 13.5 13.0 - 16.0 g/dL    HCT 40.9 36.0 - 48.0 %    MCV 89.5 74.0 - 97.0 FL    MCH 29.5 24.0 - 34.0 PG    MCHC 33.0 31.0 - 37.0 g/dL    RDW 15.3 (H) 11.6 - 14.5 %    PLATELET 996 139 - 984 K/uL    MPV 12.0 (H) 9.2 - 11.8 FL   CBC W/O DIFF    Collection Time: 02/22/18  3:00 AM   Result Value Ref Range    WBC 10.3 4.6 - 13.2 K/uL    RBC 4.63 (L) 4.70 - 5.50 M/uL    HGB 13.7 13.0 - 16.0 g/dL    HCT 41.3 36.0 - 48.0 %    MCV 89.2 74.0 - 97.0 FL    MCH 29.6 24.0 - 34.0 PG    MCHC 33.2 31.0 - 37.0 g/dL    RDW 15.3 (H) 11.6 - 14.5 %    PLATELET 269 492 - 297 K/uL    MPV 11.7 9.2 - 11.8 FL   PTT    Collection Time: 02/22/18  3:00 AM   Result Value Ref Range    aPTT 79.0 (H) 23.0 - 00.2 SEC   METABOLIC PANEL, BASIC    Collection Time: 02/22/18  3:00 AM   Result Value Ref Range    Sodium 146 (H) 136 - 145 mmol/L    Potassium 4.3 3.5 - 5.5 mmol/L    Chloride 111 (H) 100 - 108 mmol/L    CO2 22 21 - 32 mmol/L    Anion gap 13 3.0 - 18 mmol/L    Glucose 107 (H) 74 - 99 mg/dL    BUN 70 (H) 7.0 - 18 MG/DL    Creatinine 2.20 (H) 0.6 - 1.3 MG/DL    BUN/Creatinine ratio 32 (H) 12 - 20      GFR est AA 35 (L) >60 ml/min/1.73m2    GFR est non-AA 29 (L) >60 ml/min/1.73m2    Calcium 8.3 (L) 8.5 - 10.1 MG/DL           No results for input(s): FIO2I, IFO2, HCO3I, IHCO3, HCOPOC, PCO2I, PCOPOC, IPHI, PHI, PHPOC, PO2I, PO2POC in the last 72 hours. No lab exists for component: IPOC2    Recent Labs      02/22/18   0300  02/21/18   1030  02/20/18   1045   WBC  10.3  10.3  7.9   HGB  13.7  13.5  12.2*   HCT  41.3  40.9  37.2   PLT  244  251  238     Recent Labs      02/22/18   0300  02/21/18   0825  02/20/18   1803  02/20/18   1045  02/20/18   0604   NA  146*  146*  148*  147*  148*   K  4.3  4.4  4.3  4.1  3.9   CL  111*  112*  115*  115*  115*   CO2  22  17*  19*  20*  21   GLU  107*  120*  135*  137*  111*   BUN  70*  61*  40*  36*  34*   CREA  2.20*  2.18*  1.59*  1.18  1.15   CA  8.3*  8.8  8.9  8.7  9.1   MG   --    --    --   2.1   --    PHOS   --    --    --   4.0   --    ALB   --    --    --   3.3*  3.5   SGOT   --    --    --   238*  168*   ALT   --    --    --   60  52   INR   --    --    --   1.2   --      No results for input(s): PH, PCO2, PO2, HCO3, FIO2 in the last 72 hours.     Lab Results   Component Value Date/Time    Color DARK YELLOW 02/20/2018 09:25 AM    Appearance CLOUDY 02/20/2018 09:25 AM    Specific gravity 1.026 02/20/2018 09:25 AM    pH (UA) 5.0 02/20/2018 09:25 AM    Protein 100 (A) 02/20/2018 09:25 AM    Glucose NEGATIVE  02/20/2018 09:25 AM    Ketone TRACE (A) 02/20/2018 09:25 AM    Bilirubin NEGATIVE  02/20/2018 09:25 AM    Urobilinogen 1.0 02/20/2018 09:25 AM    Nitrites NEGATIVE  02/20/2018 09:25 AM    Leukocyte Esterase SMALL (A) 02/20/2018 09:25 AM    Epithelial cells FEW 02/20/2018 09:25 AM    Bacteria 2+ (A) 02/20/2018 09:25 AM    WBC 21 to 35 02/20/2018 09:25 AM    RBC 21 to 35 02/20/2018 09:25 AM       Cultures:   Blood Culture: (2/20) NGTD  Urine Culture: (2/20) NGTD      ASSESSMENT/PLAN:   Principal Problem:    Heart failure, systolic, with acute decompensation ([facility] [address] (2/20/2018)    Active Problems:    NSTEMI (non-ST elevated myocardial infarction) ([facility] [address] (2/20/2018)      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 May need to consider using Nitropaste if his nausea (anginal equivalent) returns. Monitor renal function. On empiric Rocephin for urinary tract infection. Follow up urine cultures. On aspirin, nitroglycerin, metoprolol, atorvastatin. ACE-I/ARB on hold with rising creatinine. Digoxin per Cardiology. Retroperitoneal U/S (kidneys). Renal dosing of medications. Glucose control: glucose stabilization per ICU protocol     NUTRITION: Cardiac diet. Check prealbumin q week. Consult Clinical Dietician. ICU electrolyte replacement protocol    PROPHYLAXIS:  DVT prophylaxis: heparin  GI prophylaxis: Protonix  HOB 30-degree elevation  Chlorhexidine mouth washes    CODE STATUS: FULL CODE    Further recommendations will be based on the patient's response to recommended treatment and results of the investigation ordered. DISPOSITION:    The patient is: [x] acutely ill Risk of deterioration: [x] moderate    [] critically ill  [x] high     [x]See my orders for details    My assessment, plan of care, findings, medications, side effects etc were discussed with:  [x] Nurse [] PT/OT    [x] Respiratory therapy [x] Palliative Care Team   [x] Family [] Patient: answered all questions to satisfaction   [x] Pharmacist []      [x] Case & management strategies discussed today on multidisciplinary rounds    During this entire length of time I was immediately available to the patient.  The reason for providing this level of medical care for this critically ill patient was due a critical illness that impaired one or more vital organ systems such that there was a high probability of imminent or life threatening deterioration in the patients condition. This care involved high complexity decision making to assess, manipulate, and support vital system functions, to treat this degreee vital organ system failure and to prevent further life threatening deterioration of the patients condition    []Total critical care time spent on reviewing the case/medical record/data/notes/EMR/patient examination/documentation/coordinating care with nurse/consultants, exclusive of procedures - minutes with complex decision making performed and > 50% time spent in face to face evaluation.         Elida Beasley MD   2/22/2018

## 2018-02-23 NOTE — PROGRESS NOTES
Pt resting comfortably on 3L O2 via NC. SpO2 94, RR 30, Pt asked to go back on BIPAP because he could not breathe. Placed on BiPAP. Pt has known irregular respiratory pattern. Will continue to monitor closley. Within 5 minutes, pt was requesting to come back off BiPap.   JENISEL

## 2018-02-23 NOTE — PROGRESS NOTES
Hospitalist Progress Note  Charo Enamorado MD  Internal medicine/ Hospitalist    Daily Progress Note: 2/23/2018 5:15 PM      Interval history / Subjective:   Nicolasa Lal is a 81 y.o.  male with h/o asthma,copd,was to the emergency room because of short of breath. Patient is a poor historian and no family member at the time of my evaluation,so the history is mostly obtained from the ER attending. It was reported that was brought to the emergency room because of short of breath of several hours. Initially started on iv fluid and antibiotics for possible sepsis by the ER doctor who initially saw patient. When the replacing ER arrived and reviewed the cxr and lab results,it was noted that patient had elevated troponin at 10.20 and CXR showed pulmonary edema. Lasix iv was given and cardiology was consulted for NSTEMI. While talking to patient,he has reported having some chest pain but was not specific. On 2/21,bumex gtt d/c'marbin as creatinine went up to 2.18,pt will be on lasix prn. ACE-I/ARB on hold due to MAREK.       Current Facility-Administered Medications   Medication Dose Route Frequency    barium sulfate (VARIBAR PUDDING) 40 % (w/v), 30% (w/w) contrast oral paste 15 mL  15 mL Oral RAD ONCE    barium sulfate (VARIBAR NECTAR) 40 % (w/v) contrast suspension 20 mL  20 mL Oral RAD ONCE    barium sulfate (VARIBAR THIN) 40 % (w/v) oral powder 30 mL  30 mL Oral RAD ONCE    pantoprazole (PROTONIX) tablet 40 mg  40 mg Oral ACB    digoxin (LANOXIN) tablet 0.25 mg  0.25 mg Oral DAILY    sodium chloride (NS) flush 5-10 mL  5-10 mL IntraVENous PRN    heparin 25,000 units in D5W 250 ml infusion  12-25 Units/kg/hr IntraVENous TITRATE    acetaminophen (TYLENOL) tablet 500 mg  500 mg Oral Q6H PRN    aspirin delayed-release tablet 81 mg  81 mg Oral DAILY    sodium chloride (NS) flush 5-10 mL  5-10 mL IntraVENous Q8H    sodium chloride (NS) flush 5-10 mL  5-10 mL IntraVENous PRN    magnesium hydroxide (MILK OF MAGNESIA) 400 mg/5 mL oral suspension 30 mL  30 mL Oral DAILY PRN    docusate sodium (COLACE) capsule 100 mg  100 mg Oral BID    morphine injection 2 mg  2 mg IntraVENous Q4H PRN    nitroglycerin (NITROSTAT) tablet 0.4 mg  0.4 mg SubLINGual Q5MIN PRN    atorvastatin (LIPITOR) tablet 40 mg  40 mg Oral QHS    albuterol-ipratropium (DUO-NEB) 2.5 MG-0.5 MG/3 ML  3 mL Nebulization Q4H PRN    ondansetron (ZOFRAN) injection 4 mg  4 mg IntraVENous Q6H PRN    nitroglycerin (TRIDIL) 400 mcg/ml infusion  0-20 mcg/min IntraVENous TITRATE    metoprolol tartrate (LOPRESSOR) tablet 25 mg  25 mg Oral Q6H    cefTRIAXone (ROCEPHIN) 1 g in sterile water (preservative free) 10 mL IV syringe  1 g IntraVENous Q24H    metoprolol (LOPRESSOR) injection 2.5 mg  2.5 mg IntraVENous Q6H PRN        Review of Systems  Pt saying that he feels fair. Objective:     Visit Vitals    /60    Pulse (!) 110    Temp 97.5 °F (36.4 °C)    Resp 30    Ht 5' 6\" (1.676 m)    Wt 52.2 kg (115 lb 1.3 oz)    SpO2 96%    BMI 18.57 kg/m2    O2 Flow Rate (L/min): 4 l/min O2 Device: Nasal cannula    Temp (24hrs), Av.9 °F (36.6 °C), Min:97.4 °F (36.3 °C), Max:99 °F (37.2 °C)          1901 -  0700  In: 821.3 [P.O.:440; I.V.:381.3]  Out: 1660 [SQKWB:1192]  P/E  NAD,looks lethargic today. Heent:perrla,at/nc,mouth moist.  Lungs clear - anterior auscultation  Heart s1s2 nl,no m/g  Abdm:soft,not tender,bs present. Extr:no edema,good pedis pulses. Neuro:Looks lethargic today. Slow to answer questions    Data Review    Recent Results (from the past 12 hour(s))   EKG, 12 LEAD, INITIAL    Collection Time: 18 11:11 PM   Result Value Ref Range    Ventricular Rate 108 BPM    Atrial Rate 108 BPM    P-R Interval 184 ms    QRS Duration 100 ms    Q-T Interval 344 ms    QTC Calculation (Bezet) 460 ms    Calculated P Axis 43 degrees    Calculated R Axis 43 degrees    Calculated T Axis 22 degrees    Diagnosis       Sinus tachycardia with frequent premature ventricular complexes and fusion   complexes  Anterior infarct , age undetermined  ST & T wave abnormality, consider inferior ischemia  Abnormal ECG  When compared with ECG of 20-FEB-2018 15:19,  fusion complexes are now present  premature ventricular complexes are now present  T wave inversion no longer evident in Lateral leads     CBC W/O DIFF    Collection Time: 02/23/18  5:00 AM   Result Value Ref Range    WBC 10.5 4.6 - 13.2 K/uL    RBC 4.66 (L) 4.70 - 5.50 M/uL    HGB 13.8 13.0 - 16.0 g/dL    HCT 42.1 36.0 - 48.0 %    MCV 90.3 74.0 - 97.0 FL    MCH 29.6 24.0 - 34.0 PG    MCHC 32.8 31.0 - 37.0 g/dL    RDW 15.2 (H) 11.6 - 14.5 %    PLATELET 132 780 - 149 K/uL    MPV 11.8 9.2 - 62.7 FL   METABOLIC PANEL, BASIC    Collection Time: 02/23/18  5:00 AM   Result Value Ref Range    Sodium 149 (H) 136 - 145 mmol/L    Potassium 4.1 3.5 - 5.5 mmol/L    Chloride 113 (H) 100 - 108 mmol/L    CO2 24 21 - 32 mmol/L    Anion gap 12 3.0 - 18 mmol/L    Glucose 127 (H) 74 - 99 mg/dL    BUN 78 (H) 7.0 - 18 MG/DL    Creatinine 1.86 (H) 0.6 - 1.3 MG/DL    BUN/Creatinine ratio 42 (H) 12 - 20      GFR est AA 42 (L) >60 ml/min/1.73m2    GFR est non-AA 35 (L) >60 ml/min/1.73m2    Calcium 8.7 8.5 - 10.1 MG/DL   CARDIAC PANEL,(CK, CKMB & TROPONIN)    Collection Time: 02/23/18  5:00 AM   Result Value Ref Range     39 - 308 U/L    CK - MB 10.3 (H) <3.6 ng/ml    CK-MB Index 4.2 (H) 0.0 - 4.0 %    Troponin-I, Qt. 15.10 (HH) 0.0 - 0.045 NG/ML   PTT    Collection Time: 02/23/18  5:00 AM   Result Value Ref Range    aPTT 72.7 (H) 23.0 - 36.4 SEC   PHOSPHORUS    Collection Time: 02/23/18  5:00 AM   Result Value Ref Range    Phosphorus 4.6 2.5 - 4.9 MG/DL   MAGNESIUM    Collection Time: 02/23/18  5:00 AM   Result Value Ref Range    Magnesium 2.9 (H) 1.6 - 2.6 mg/dL         Assessment/Plan:     Principal Problem:    Heart failure, systolic, with acute decompensation (HCC) (2/20/2018)    Active Problems:    [facility]non[facility] (2/20/2018)      Pulmonary edema (2/20/2018)      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 CHF,systolic with acute exacerbation:    -Pt received iv lasix in ER. Then started on bumex infusion.    -On 2/21 bumex gtt d/c'ed as creatinine went up. Now on prn lasix    -On BB,Digoxin. Nitrate gtt. ACE-I/ARB on hold    -CXR on 2/20:underexpanded lungs with progressive central vascular congestion and likely mild interstitial edema superimposed upon underlying pulmonary fibrotic change. -ECHO c/w 15 % to 20 %. There was severe hypokinesis / akinesis    -pro-bnp [id number] on admission    -Cardiology following - follow recommendations    2. NSTEMI:    -Troponin:10.20 ->25.70 ->36.00    -On aspirin.    -On heparin gtt    -On BB. Nitrate drip    -ECHO as above    -Cardiology following - follow recommendations    3. UTI:    -On rocephin    -Ucx and Blood cx negative    4. COPD    -On duo-neb    5. Sleep apnea    -Allow CPAP    6. Hypernatremia:likely dehydration    -will allow water - then follow bmp in am    DVT prophylaxis:on heparin  DNR - family has decided for code on 2/22.   Surrogates:son and daughter  Disposition:tbd

## 2018-02-23 NOTE — PROGRESS NOTES
Palliative  care has met with patient, he has  Capacity and had made a decisions regarding his medical treatment moving forward. Patient has stated he will consider rehab at discharge. Will provide the patient with SNF list and match him to facilities of choice. I have spoken to Dr Tiny Pisano and he has discontinued bedrest order and has given orders for PT and OT Discharge plan is for SNF. He will  Require authorization for admission to SNF.  Markel Resendiz RN

## 2018-02-23 NOTE — PROGRESS NOTES
[facility]  -  Progress Note      Patient: Sarah De La O MRN: [id number]  SSN: [id number]    YOB: 1935  Age: 81 y.o. Sex: male      Admit Date: 2/20/2018  Patient seen and examined independently. Discussed further options with patient and son for any additional cardiac investigation including invasive evaluation or  medical therapy. They would prefer conservative measures . Will DC heparin. Agree with assessment and plan as noted below. Joseph Fernandez MD  Assessment:     -Respiratory distress, tachypnic and tachycardic, ? Multifactorial with CHF, NSTEMI and underlying pulmonary disease  -NSTEMI, troponin 10.2>25.7>36>50.9>45.3, lateral ST depression, on Heparin gtts, ASA, Lipitor, Lopressor, nitro gtts  - Cardiomyopathy: LVEF 15-20% with RWMA. Takotsubo vs multivessel CAD  -CT 02/21/2018: Multifocal groundglass opacities occurring in a background of relatively diffuse interlobular septal line thickening, consistent with combination of interstitial and alveolar edema. Mild compressive atelectasis at each lung base related to moderate R and small L pleural effusions. Mild cardiomegaly w/o pericardial effusion. -COPD  -Sarcoidosis  -Asthma  -HTN    Plan:     -Son is at bedside this morning and reports that patient is now DNR. Son is mPOA and indicates that medical management would be preferred at this point, instead of invasive coronary evaluation with cardiac catheterization. Continue medical management with ASA, Lipitor, Lopressor. No ACE/ARB due to MAREK. -Will add maintenance Lasix.    -Discontinue Digoxin. -Recommend discontinuing ACS protocol Heparin, will defer VTE prophylaxis to primary team.     Subjective:     No new complaints.       Objective:      Patient Vitals for the past 8 hrs:   Pulse Resp BP SpO2   02/23/18 0800 (!) 110 30 107/60 96 %   02/23/18 0700 (!) 116 (!) 33 111/61 92 %   02/23/18 0604 (!) 110 24 109/50 97 %         Patient Vitals for the past 96 hrs:   Weight   02/23/18 0400 115 lb 1.3 oz (52.2 kg)   02/22/18 1309 145 lb 8.1 oz (66 kg)   02/21/18 1029 145 lb 11.6 oz (66.1 kg)   02/20/18 1008 148 lb 2.4 oz (67.2 kg)   02/20/18 0544 150 lb (68 kg)         Intake/Output Summary (Last 24 hours) at 02/23/18 1325  Last data filed at 02/23/18 0700   Gross per 24 hour   Intake           263.15 ml   Output              750 ml   Net          -486.85 ml       Physical Exam:  General:  alert, cooperative, no distress, appears stated age  Lungs:  Rales more prominent on right  Heart:  Tachycardic slightly irregular rhythm  Abdomen:  abdomen is soft without significant tenderness, masses, organomegaly or guarding  Extremities:  no edema     Data Review:     Labs: Results:       Chemistry Recent Labs      02/23/18   0500  02/22/18   0300  02/21/18   0825   GLU  127*  107*  120*   NA  149*  146*  146*   K  4.1  4.3  4.4   CL  113*  111*  112*   CO2  24  22  17*   BUN  78*  70*  61*   CREA  1.86*  2.20*  2.18*   CA  8.7  8.3*  8.8   MG  2.9*   --    --    PHOS  4.6   --    --    AGAP  12  13  17   BUCR  42*  32*  28*      CBC w/Diff Recent Labs      02/23/18   0500  02/22/18   0300  02/21/18   1030   WBC  10.5  10.3  10.3   RBC  4.66*  4.63*  4.57*   HGB  13.8  13.7  13.5   HCT  42.1  41.3  40.9   PLT  247  244  251      Cardiac Enzymes Lab Results   Component Value Date/Time     02/23/2018 05:00 AM    CKMB 10.3 (H) 02/23/2018 05:00 AM    CKND1 4.2 (H) 02/23/2018 05:00 AM    TROIQ 15.10 (HH) 02/23/2018 05:00 AM      Coagulation Recent Labs      02/23/18   1100  02/23/18   0500   APTT  63.1*  72.7*       Lipid Panel Lab Results   Component Value Date/Time    Cholesterol, total 140 02/21/2018 03:00 AM    HDL Cholesterol 57 02/21/2018 03:00 AM    LDL,Direct 111 (H) 05/18/2010 02:21 PM    LDL, calculated 66.6 02/21/2018 03:00 AM    VLDL, calculated 16.4 02/21/2018 03:00 AM    Triglyceride 82 02/21/2018 03:00 AM    CHOL/HDL Ratio 2.5 02/21/2018 03:00 AM      Thyroid Studies Lab Results   Component Value Date/Time    TSH 1.50 02/20/2018 10:45 AM

## 2018-02-23 NOTE — PROGRESS NOTES
Speech Therapy Note:    MBS completed with SILENT aspiration on thin liquids. Pt safe for mech-soft (chopped meats)/nectar-thick liquid diet. Meds should be presented one at a time with nectar or in pudding. Full report to follow. Clarita Sepulveda., [id number] [facility]  Office: [phone]  Pager: [phone]

## 2018-02-23 NOTE — PROGRESS NOTES
[facility]   Pulmonary, Critical Care, and Sleep Medicine     Critical Care Progress Note    Name: Jessica Armas   : 1935   MRN: [id number]   Date: 2018                                 [x]I have reviewed the flowsheet and previous days notes. Events, vitals, medications and notes from last 24 hours reviewed. Care plan discussed on multidisciplinary rounds. [] The patient is unable to give any meaningful history or review of systems because the patient is:  [] Intubated [] Sedated   [] Unresponsive []      [x]The patient is critically ill on      [] Mechanical ventilation [] Vasoactive agents   [x] BiPAP [] Inotropes                 SUBJECTIVE  - This 81 y.o. [place] male nonsmoker was originally on 2018 seen in consultation at the request of Dr. Rommel Martinez for recommendations on further evaluation and management of respiratory distress. The patient presented to the ER with complaints of increasing dyspnea over the 2 days prior to presentation. He also endorsed chest pain. He reports that he is a lifetime nonsmoker but apparently carries a history of COPD. In the ER, the patient was diagnosed with NSTEMI and acute systolic heart failure. He was started on sepsis bundle and got about 1500 mL of IV fluid resuscitation and got a dose of levofloxacin. He remained tachycardic and continued to have \"labored\" respirations and was placed on BiPAP support. At the time of clinical interview, the patient has come up to the ICU and is on BiPAP. FiO2 25%. SpO2 97%. -120. RR 0-40, classic Cheynes-Sanchez pattern.     He was evaluated by Cardiology in the ER and was started on aspirin and heparin gtt. He has also been started on low-dose beta blockade. He was apparently given a single dose of furosemide. There is some confusion about the exact dose, as it appears 40 mg ordered but 20 mg administered.  In any case, only a modest diuretic response has been observed with ~ 50 mL/hr. He apparently had some nausea and vomiting this am. At present, he denies nausea or vomiting. He endorses intermittent chest pain. - Overnight events: Had modified Barium swallow this am, on pureed diet. No nausea. No emesis. Denies chest pain. Had to restart Tridil gtt overnight due to nausea that recurred after stopping Tridil. Bumex gtt. Denies orthopnea or paroxysmal nocturnal dyspnea. [x] Nutrition: cardiac  [] BM today. ROS: A comprehensive review of systems was negative except for that written in the HPI.     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)     Asthma     Chronic obstructive pulmonary disease ([facility] [address]     Hypertension         Allergy:  No Known Allergies       Current Facility-Administered Medications:     pantoprazole (PROTONIX) tablet 40 mg, 40 mg, Oral, ACB, Ana Yee MD, 40 mg at 02/23/18 0802    digoxin (LANOXIN) tablet 0.25 mg, 0.25 mg, Oral, DAILY, Alyssa Rai PA-C, 0.25 mg at 02/23/18 0802    sodium chloride (NS) flush 5-10 mL, 5-10 mL, IntraVENous, PRN, Ren Ryan MD    heparin 25,000 units in D5W 250 ml infusion, 12-25 Units/kg/hr, IntraVENous, TITRATE, Lianna Ace DO, Last Rate: 9.5 mL/hr at 02/23/18 0626, 14 Units/kg/hr at 02/23/18 8745    acetaminophen (TYLENOL) tablet 500 mg, 500 mg, Oral, Q6H PRN, Ana Yee MD    aspirin delayed-release tablet 81 mg, 81 mg, Oral, DAILY, Ana Yee MD, 81 mg at 02/23/18 0802    sodium chloride (NS) flush 5-10 mL, 5-10 mL, IntraVENous, Q8H, Ana Yee MD, Stopped at 02/23/18 0600    sodium chloride (NS) flush 5-10 mL, 5-10 mL, IntraVENous, PRN, Ana Yee MD    magnesium hydroxide (MILK OF MAGNESIA) 400 mg/5 mL oral suspension 30 mL, 30 mL, Oral, DAILY PRN, Ana Yee MD    docusate sodium (COLACE) capsule 100 mg, 100 mg, Oral, BID, Ana Yee MD, 100 mg at 02/23/18 0802    morphine injection 2 mg, 2 mg, IntraVENous, Q4H PRN, Sandro Locke MD    nitroglycerin (NITROSTAT) tablet 0.4 mg, 0.4 mg, SubLINGual, Q5MIN PRN, Sandro Locke MD    atorvastatin (LIPITOR) tablet 40 mg, 40 mg, Oral, QHS, Sandro Locke MD, 40 mg at 18 2155    albuterol-ipratropium (DUO-NEB) 2.5 MG-0.5 MG/3 ML, 3 mL, Nebulization, Q4H PRN, Sandro Locke MD, Stopped at 18 0838    ondansetron (ZOFRAN) injection 4 mg, 4 mg, IntraVENous, Q6H PRN, Sandro Locke MD, 4 mg at 18 1359    nitroglycerin (TRIDIL) 400 mcg/ml infusion, 0-20 mcg/min, IntraVENous, TITRATE, Jose Alexander MD, Last Rate: 1.5 mL/hr at 18, 10 mcg/min at 18 2301    metoprolol tartrate (LOPRESSOR) tablet 25 mg, 25 mg, Oral, Q6H, Jose Alexander MD, 25 mg at 18 0553    cefTRIAXone (ROCEPHIN) 1 g in sterile water (preservative free) 10 mL IV syringe, 1 g, IntraVENous, Q24H, Jose Alexander MD, 1 g at 18 1323    metoprolol (LOPRESSOR) injection 2.5 mg, 2.5 mg, IntraVENous, Q6H PRN, Alyssa Rai PA-C      OBJECTIVE  Vital Signs:    Visit Vitals    /60    Pulse (!) 110    Temp 97.5 °F (36.4 °C)    Resp 30    Ht 5' 6\" (1.676 m)    Wt 52.2 kg (115 lb 1.3 oz)    SpO2 96%    BMI 18.57 kg/m2       O2 Device: Nasal cannula   O2 Flow Rate (L/min): 4 l/min   Temp (24hrs), Av.9 °F (36.6 °C), Min:97.4 °F (36.3 °C), Max:99 °F (37.2 °C)       PHYSICAL EXAM:   General: alert, awake and oriented to time, person, place. Cooperative. No acute distress. Head: atraumatic, normocephalic  Eye: OD blindness, no scleral icterus, no pallor, no cyanosis  Nose: Nares are patent. No polyps. No exudate. No sinus tenderness. Throat: No oral thrush. No exudate. Mucous membranes are moist. No tonsillar enlargement. Neck: supple, no thyromegaly. JVD present. No lymphadenopathy. Lung: Symmetric in development and expansion. Good air entry. Decreased bilateral, lower-lung predominant crackles. No wheezes.    Heart: Regular S1, S2 without murmur, rub or gallop. Tachycardic. Abdomen: soft, nontender, nondistended. Normoactive bowel sounds. No rebound. No guarding. Extremities: no pedal edema, no cyanosis, no clubbing, 2+ peripheral pulses in DP  Lymphatic: no cervical/axillary/inguinal lymphadenopathy  Neurologic: Cranial nerves II-XII are grossly symmetric and physiologic. Babinski negative. No sensory deficit. No motor deficit. DTR [email or web address], 2+@LUE, 2+@RLE, 2+@LLE. No cerebellar signs. Gait was not assessed. Skin: No rash or lesion. DATA:   CVP: - mmHg  SvO2 - % from distal port of CVC    Intake/Output:   Last shift:           Last 3 shifts: 02/21 1901 - 02/23 0700  In: 821.3 [P.O.:440; I.V.:381.3]  Out: 1660 [Urine:1660]      Intake/Output Summary (Last 24 hours) at 02/23/18 0837  Last data filed at 02/23/18 0700   Gross per 24 hour   Intake           676.95 ml   Output              970 ml   Net          -293.05 ml       Last 3 Recorded Weights in this Encounter    02/21/18 1029 02/22/18 1309 02/23/18 0400   Weight: 66.1 kg (145 lb 11.6 oz) 66 kg (145 lb 8.1 oz) 52.2 kg (115 lb 1.3 oz)     Lines: All central lines examined by me. No signs of erythema, induration, discharge. Peripheral Intravenous Line:  Peripheral IV 02/20/18 Right Antecubital (Active)   Site Assessment Clean, dry, & intact 2/22/2018  6:00 AM   Phlebitis Assessment 0 2/22/2018  6:00 AM   Infiltration Assessment 0 2/22/2018  6:00 AM   Dressing Status Clean, dry, & intact 2/22/2018  6:00 AM   Dressing Type Transparent 2/22/2018  6:00 AM   Hub Color/Line Status Green; Infusing 2/22/2018  6:00 AM   Action Taken Open ports on tubing capped 2/22/2018  6:00 AM   Alcohol Cap Used Yes 2/22/2018  6:00 AM     Telemetry: [x]Sinus []A-flutter []Paced    []A-fib []Multiple PVCs     Imaging:  [x] I have personally reviewed the patients radiographs  [] Radiographs reviewed with radiologist  [] No CXR study available for review today  [] No change from prior, tubes and lines are in adequate position  [] Improved   [x] Worsening    CXR: (2/21) asymmetrical pulmonary edema versus pneumonia versus ARDS with progression on  the right. Cannot exclude minimal bilateral pleural effusions.     CT (2/21, chest): multifocal groundglass opacities occurring in a background of relatively diffuse interlobular septal line thickening; mild compressive atelectasis at each lung base related to moderate right and small left pleural effusions; mild cardiomegaly without pericardial effusion; upper pole left renal stones, with mild left-sided pelviectasis                  Labs:  Recent Results (from the past 24 hour(s))   EKG, 12 LEAD, INITIAL    Collection Time: 02/22/18 11:11 PM   Result Value Ref Range    Ventricular Rate 108 BPM    Atrial Rate 108 BPM    P-R Interval 184 ms    QRS Duration 100 ms    Q-T Interval 344 ms    QTC Calculation (Bezet) 460 ms    Calculated P Axis 43 degrees    Calculated R Axis 43 degrees    Calculated T Axis 22 degrees    Diagnosis       Sinus tachycardia with frequent premature ventricular complexes and fusion   complexes  Anterior infarct , age undetermined  ST & T wave abnormality, consider inferior ischemia  Abnormal ECG  When compared with ECG of 20-FEB-2018 15:19,  fusion complexes are now present  premature ventricular complexes are now present  T wave inversion no longer evident in Lateral leads     CBC W/O DIFF    Collection Time: 02/23/18  5:00 AM   Result Value Ref Range    WBC 10.5 4.6 - 13.2 K/uL    RBC 4.66 (L) 4.70 - 5.50 M/uL    HGB 13.8 13.0 - 16.0 g/dL    HCT 42.1 36.0 - 48.0 %    MCV 90.3 74.0 - 97.0 FL    MCH 29.6 24.0 - 34.0 PG    MCHC 32.8 31.0 - 37.0 g/dL    RDW 15.2 (H) 11.6 - 14.5 %    PLATELET 102 989 - 362 K/uL    MPV 11.8 9.2 - 23.3 FL   METABOLIC PANEL, BASIC    Collection Time: 02/23/18  5:00 AM   Result Value Ref Range    Sodium 149 (H) 136 - 145 mmol/L    Potassium 4.1 3.5 - 5.5 mmol/L    Chloride 113 (H) 100 - 108 mmol/L    CO2 24 21 - 32 mmol/L Anion gap 12 3.0 - 18 mmol/L    Glucose 127 (H) 74 - 99 mg/dL    BUN 78 (H) 7.0 - 18 MG/DL    Creatinine 1.86 (H) 0.6 - 1.3 MG/DL    BUN/Creatinine ratio 42 (H) 12 - 20      GFR est AA 42 (L) >60 ml/min/1.73m2    GFR est non-AA 35 (L) >60 ml/min/1.73m2    Calcium 8.7 8.5 - 10.1 MG/DL   CARDIAC PANEL,(CK, CKMB & TROPONIN)    Collection Time: 02/23/18  5:00 AM   Result Value Ref Range     39 - 308 U/L    CK - MB 10.3 (H) <3.6 ng/ml    CK-MB Index 4.2 (H) 0.0 - 4.0 %    Troponin-I, Qt. 15.10 (HH) 0.0 - 0.045 NG/ML   PTT    Collection Time: 02/23/18  5:00 AM   Result Value Ref Range    aPTT 72.7 (H) 23.0 - 36.4 SEC   PHOSPHORUS    Collection Time: 02/23/18  5:00 AM   Result Value Ref Range    Phosphorus 4.6 2.5 - 4.9 MG/DL   MAGNESIUM    Collection Time: 02/23/18  5:00 AM   Result Value Ref Range    Magnesium 2.9 (H) 1.6 - 2.6 mg/dL           No results for input(s): FIO2I, IFO2, HCO3I, IHCO3, HCOPOC, PCO2I, PCOPOC, IPHI, PHI, PHPOC, PO2I, PO2POC in the last 72 hours. No lab exists for component: IPOC2    Recent Labs      02/23/18   0500  02/22/18   0300  02/21/18   1030   WBC  10.5  10.3  10.3   HGB  13.8  13.7  13.5   HCT  42.1  41.3  40.9   PLT  247  244  251     Recent Labs      02/23/18   0500  02/22/18   0300  02/21/18   0825   02/20/18   1045   NA  149*  146*  146*   < >  147*   K  4.1  4.3  4.4   < >  4.1   CL  113*  111*  112*   < >  115*   CO2  24  22  17*   < >  20*   GLU  127*  107*  120*   < >  137*   BUN  78*  70*  61*   < >  36*   CREA  1.86*  2.20*  2.18*   < >  1.18   CA  8.7  8.3*  8.8   < >  8.7   MG  2.9*   --    --    --   2.1   PHOS  4.6   --    --    --   4.0   ALB   --    --    --    --   3.3*   SGOT   --    --    --    --   238*   ALT   --    --    --    --   60   INR   --    --    --    --   1.2    < > = values in this interval not displayed. No results for input(s): PH, PCO2, PO2, HCO3, FIO2 in the last 72 hours.     Lab Results   Component Value Date/Time    Color DARK YELLOW 02/20/2018 09:25 AM    Appearance CLOUDY 02/20/2018 09:25 AM    Specific gravity 1.026 02/20/2018 09:25 AM    pH (UA) 5.0 02/20/2018 09:25 AM    Protein 100 (A) 02/20/2018 09:25 AM    Glucose NEGATIVE  02/20/2018 09:25 AM    Ketone TRACE (A) 02/20/2018 09:25 AM    Bilirubin NEGATIVE  02/20/2018 09:25 AM    Urobilinogen 1.0 02/20/2018 09:25 AM    Nitrites NEGATIVE  02/20/2018 09:25 AM    Leukocyte Esterase SMALL (A) 02/20/2018 09:25 AM    Epithelial cells FEW 02/20/2018 09:25 AM    Bacteria 2+ (A) 02/20/2018 09:25 AM    WBC 21 to 35 02/20/2018 09:25 AM    RBC 21 to 35 02/20/2018 09:25 AM       Cultures:   Blood Culture: (2/20) NGTD  Urine Culture: (2/20) NGTD      ASSESSMENT/PLAN:   Principal Problem:    Heart failure, systolic, with acute decompensation ([facility] [address] (2/20/2018)    Active Problems:    NSTEMI (non-ST elevated myocardial infarction) ([address] (2/20/2018)      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 Use Nitropaste. Monitor renal function. On empiric Rocephin for urinary tract infection. Follow up urine cultures. On aspirin, nitroglycerin, metoprolol, atorvastatin. ACE-I/ARB on hold with rising creatinine. Digoxin per Cardiology. Retroperitoneal U/S (kidneys). Renal dosing of medications. Glucose control: glucose stabilization per ICU protocol     NUTRITION: Cardiac diet. Check prealbumin q week. Consult Clinical Dietician.   ICU electrolyte replacement protocol    PROPHYLAXIS:  DVT prophylaxis: heparin  GI prophylaxis: Protonix  HOB 30-degree elevation  Chlorhexidine mouth washes    CODE STATUS: FULL CODE    Further recommendations will be based on the patient's response to recommended treatment and results of the investigation ordered. DISPOSITION:    The patient is: [x] acutely ill Risk of deterioration: [x] moderate    [] critically ill  [x] high     [x]See my orders for details    My assessment, plan of care, findings, medications, side effects etc were discussed with:  [x] Nurse [] PT/OT    [x] Respiratory therapy [x] Palliative Care Team   [x] Family [] Patient: answered all questions to satisfaction   [x] Pharmacist []      [x] Case & management strategies discussed today on multidisciplinary rounds    During this entire length of time I was immediately available to the patient. The reason for providing this level of medical care for this critically ill patient was due a critical illness that impaired one or more vital organ systems such that there was a high probability of imminent or life threatening deterioration in the patients condition. This care involved high complexity decision making to assess, manipulate, and support vital system functions, to treat this degreee vital organ system failure and to prevent further life threatening deterioration of the patients condition    []Total critical care time spent on reviewing the case/medical record/data/notes/EMR/patient examination/documentation/coordinating care with nurse/consultants, exclusive of procedures - minutes with complex decision making performed and > 50% time spent in face to face evaluation.         Adelene Gottron, MD   2/23/2018

## 2018-02-23 NOTE — PROGRESS NOTES
0830 Departed to xray for barium swallow eval  0934 Returned  0945 Performed mouth care    1200 Patient pulled peripheral IV out  1235 Attempting to insert IV  1400 Finally able to get a working IV    1711 Enio Patel      0800/1200/1600 Performed [place] care

## 2018-02-23 NOTE — PROGRESS NOTES
Problem: Dysphagia (Adult)  Goal: *Acute Goals and Plan of Care (Insert Text)  Diet: mechanical soft solids, nectar-thick liquids  Meds: one at a time in puree  Aspiration Precautions  Other: oral care TID    Patient will:  1. Tolerate PO trials with 0 s/s overt distress in 4/5 trials  2. Utilize compensatory swallow strategies/maneuvers (decrease bite/sip, size/rate, alt. liq/sol) with min cues in 4/5 trials  3. Perform oral-motor/laryngeal exercises to increase oropharyngeal swallow function with min cues  4. Complete an objective swallow study (i.e., MBSS) to assess swallow integrity, r/o aspiration, and determine of safest LRD, min A - goal met 2/23/18         Outcome: Progressing Towards Goal  Speech Pathology Modified barium swallow Study    Patient: Nereida Hewitt (40 y.o. male)  Date: 2/23/2018  Primary Diagnosis: NSTEMI (non-ST elevated myocardial infarction) [facility])        Precautions: aspiration        ASSESSMENT :  MBS completed with SILENT aspiration on thin liquid + straw; unable to attempt straw removal or postural changes. Nectar-thick liquids via straw with trace penetration on 2 of 7 consecutive trials; adequate airway protection appreciated. Pudding and cracker accepted without aspiration. Consistently exhibited delayed oral bolus  Prep and transit with consistent premature spillage into valleculae and pyriforms. Consistent swallow delay ~2 seconds across all trials. Decreased hyolaryngeal excursion resulting in residue in valleculae and pyriforms. Pt able to clear with cues for x 2 swallows. Pt presents with moderate oropharyngeal dysphagia, as evidenced above, which places pt at risk for aspiration. At this time, [facility] (chopped meat) solid, nectar-thick liquid diet; meds crushed in applesauce. SLP utilized video of study for visual feedback, education, and recommendations for pt and RN, Sunday Sheth; verbalized comprehension.      Patient will benefit from skilled intervention to address the above impairments. Patients rehabilitation potential is considered to be Fair  Factors which may influence rehabilitation potential include:   []              None noted  [x]              Mental ability/status  [x]              Medical condition  [x]              Home/family situation and support systems  [x]              Safety awareness  []              Pain tolerance/management  []              Other:      PLAN :  Recommendations and Planned Interventions:  mech-soft (chopped meat) solid, nectar-thick liquid diet; meds crushed in applesauce. Frequency/Duration: Patient will be followed by speech-language pathology 1-2 times per day/4-7 days per week to address goals. Discharge Recommendations: Skilled Nursing Facility     SUBJECTIVE:   Patient stated Get me out! . OBJECTIVE:     Past Medical History:   Diagnosis Date    Asbestosis(501)     Asthma     Chronic obstructive pulmonary disease ([facility] [address]     Hypertension    History reviewed. No pertinent surgical history. Prior Level of Function/Home Situation: lives with family   Home Situation  Home Environment: Apartment  One/Two Story Residence: Two story  Living Alone: No  Support Systems: Child(daniel), Family member(s)  Patient Expects to be Discharged to[de-identified] Apartment  Current DME Used/Available at Home: None  Diet prior to admission: regular/thin  Current Diet:  mech-soft (chopped meat) solid, nectar-thick liquid diet; meds crushed in applesauce. Radiologist: Nonah Layer Views: Lateral  Patient Position: 90    Trial 1: Trial 2:   Consistency Presented:  Thin liquid Consistency Presented: Nectar thick liquid   How Presented: SLP-fed/presented;Straw How Presented: SLP-fed/presented;Straw         Bolus Acceptance: No impairment Bolus Acceptance: No impairment   Bolus Formation/Control: Impaired: Delayed;Mastication;Premature spillage Bolus Formation/Control: Impaired: Delayed;Mastication;Premature spillage   Propulsion: Delayed (# of seconds); Discoordination Propulsion: Delayed (# of seconds); Discoordination   Oral Residue: None Oral Residue: None   Initiation of Swallow: Triggered at vallecula;Triggered at pyriform sinus(es) Initiation of Swallow: Triggered at valleculae;Triggered at pyriform sinus(es)   Timing: Pooling 1-5 sec Timing: Pooling 1-5 sec   Penetration: During swallow; To cords Penetration: Flash/transient   Aspiration/Timing: Silent ;During; After;From initial swallow;From residual Aspiration/Timing: No evidence of aspiration   Pharyngeal Clearance: Vallecular residue;Pyriform residue ; Less than 10% Pharyngeal Clearance: Vallecular residue;Pyriform residue ; Less than 10%   Attempted Modifications: Double swallow     Effective Modifications: Double swallow     Cues for Modifications: Moderate-maximal             Trial 3: Trial 4:   Consistency Presented: Pudding;Mechanical soft     How Presented: SLP-fed/presented           Bolus Acceptance: No impairment     Bolus Formation/Control: Impaired: Delayed;Mastication;Premature spillage  :     Propulsion: Delayed (# of seconds); Discoordination     Oral Residue: None     Initiation of Swallow: Triggered at valleculae;Triggered at pyriform sinus(es)    Timing: Pooling 1-5 sec     Penetration: None     Aspiration/Timing: No evidence of aspiration     Pharyngeal Clearance: Vallecular residue;Pyriform residue ; Less than 10%                               Decreased Tongue Base Retraction?: Yes  Laryngeal Elevation: Incomplete laryngeal closure  Aspiration/Penetration Score: 8 (Aspiration-Contrast passes cords/glottis with no effort to eject, ie/silent aspiration)  Pharyngeal Symmetry: Not assessed  Pharyngeal-Esophageal Segment: No impairment  Pharyngeal Dysfunction: Decreased tongue base retraction;Decreased strength;Decreased elevation/closure    Oral Phase Severity: Mild-moderate  Pharyngeal Phase Severity: Moderate    GCODESwallowing:  Swallow Current Status CK= 40-59%   Swallow Goal Status CI= 1-19%    The severity rating is based on the following outcomes:  FRANK Noms Swallow Level 4    8-point Penetration-Aspiration Scale: Score 8  Clinical Judgement    PAIN:  Start of Study: 0  End of Study: 0     COMMUNICATION/EDUCATION:   [x]  Aspiration risks/precautions; compensatory swallow techniques  [x]  Patient/family have participated as able in goal setting and plan of care. [x]  Patient/family agree to work toward stated goals and plan of care. []  Patient understands intent and goals of therapy, but is neutral about his/her participation. []  Patient is unable to participate in goal setting and plan of care.     Thank you for this referral.  TRINH Talbert  Time Calculation: 24 mins

## 2018-02-23 NOTE — PROGRESS NOTES
1900 - Report received from Fitz Iglesias, [facility]. Orders, medications, labs, skin, and neuro exam reviewed. 2115 - Pt placed on BiPap. 2200 - Pt placed back on 2 liters Nasal Cannula for discomfort on BiPap. 2245 - Pt nitroglycerin restarted per order for chest pain. 2300 - Pt nitroglycerin increased to 10 mcg/min per order. 0700 - Report given to Genoveva Santana RN. Orders, medications, labs, skin, and neuro exam reviewed.

## 2018-02-23 NOTE — PROGRESS NOTES
Problem: Falls - Risk of  Goal: *Absence of Falls  Document Silvio Fall Risk and appropriate interventions in the flowsheet.    Outcome: Progressing Towards Goal  Fall Risk Interventions:  Mobility Interventions: Bed/chair exit alarm         Medication Interventions: Bed/chair exit alarm    Elimination Interventions: Bed/chair exit alarm

## 2018-02-23 NOTE — PROGRESS NOTES
1728 .TRANSFER - OUT REPORT:    Verbal report given to Katherine N [facility] RN(name) on Giovanni Boyce  being transferred to (unit) for routine progression of care       Report consisted of patients Situation, Background, Assessment and   Recommendations(SBAR). Information from the following report(s) SBAR, Kardex and MAR was reviewed with the receiving nurse. Lines:   Peripheral IV 02/23/18 Left Hand (Active)   Site Assessment Clean, dry, & intact 2/23/2018  5:00 PM   Phlebitis Assessment 0 2/23/2018  5:00 PM   Infiltration Assessment 0 2/23/2018  5:00 PM   Dressing Status Clean, dry, & intact 2/23/2018  5:00 PM   Dressing Type Transparent;Tape 2/23/2018  2:00 PM   Hub Color/Line Status Roberts Shutters; Infusing 2/23/2018  2:00 PM   Action Taken Open ports on tubing capped 2/23/2018  2:00 PM   Alcohol Cap Used Yes 2/23/2018  2:00 PM        Opportunity for questions and clarification was provided.       Patient transported with:   O2 @ 3 liters  Registered Nurse     Gave report to [employer]  All personal belongings are with patient  Son and wife at bedside  Wynne teresa

## 2018-02-23 NOTE — PROGRESS NOTES
OT order received and chart reviewed. 1157: holding OT evaluation at this time; HR is 115-120's at rest. KENYETTA Stroud aware. 1354: assisted KENYETTA Stokes with repositioning of pt. Holding OT evaluation for today due to elevated troponin. Will follow up on 2/24/2018. Thank you.     Stella Cabot, MS OTR/L  Office Ext: [phone]  Pager: [phone]

## 2018-02-23 NOTE — PROGRESS NOTES
PT orders received and chart reviewed. Holding mobilization with PT at this time d/t elevated troponin and tachycardic at rest. Will follow up as appropriate to maximize patient safety and participation in functional mobility.      Thank you,     Matti Jackson, PT, DPT

## 2018-02-23 NOTE — ROUTINE PROCESS
Bedside and Verbal shift change report given to Kala RN (oncoming nurse) by Bianca Lara RN (offgoing nurse).  Report included the following information SBAR, Kardex, Intake/Output, MAR, Recent Results, Med Rec Status and Cardiac Rhythm ST.

## 2018-02-24 NOTE — ROUTINE PROCESS
1805-received patient from ICU, family as bedside, tele-box applied, plunkett intact. No distress noted. 1940-Bedside and Verbal shift change report given to Rebeka CABRAL (oncoming nurse) by Cara Gabriel (offgoing nurse). Report included the following information SBAR, MAR and Recent Results.

## 2018-02-24 NOTE — PROGRESS NOTES
PHYSICAL THERAPY NOTE    10:08AM - Chart reviewed. Checked on patient. Patient sound asleep. Spoke with patient's Benjie George, per nurse hold PT for now, patient still with increased HR.      13:25pm - Spoke with nurse Justin Iglesias, per nurse, just hold PT for today. Patient still with increased HR and really not able to participate - patient too lethargic. Will re-attempt PT as schedule permits. Yadira Solorzano.  Elroy Campa

## 2018-02-24 NOTE — PROGRESS NOTES
Hospitalist Progress Note  Kaelyn Amador MD  Internal medicine/ Hospitalist    Daily Progress Note: 2/24/2018 5:15 PM      Interval history / Subjective:   Rikki Stratton is a 81 y.o.  male with h/o asthma,copd,was to the emergency room because of short of breath. Patient is a poor historian and no family member at the time of my evaluation,so the history is mostly obtained from the ER attending. It was reported that was brought to the emergency room because of short of breath of several hours. Initially started on iv fluid and antibiotics for possible sepsis by the ER doctor who initially saw patient. When the replacing ER arrived and reviewed the cxr and lab results,it was noted that patient had elevated troponin at 10.20 and CXR showed pulmonary edema. Lasix iv was given and cardiology was consulted for NSTEMI. He was started on heparin drip. ECHO c/w EF 15 % to 20,severe   hypokinesis / akinesis noted. On 2/21,bumex gtt d/c'marbin as creatinine went up to 2.18,pt will be on lasix prn. ACE-I/ARB on hold due to MAREK. Heparin has been d/c'ed,currently patient is on plavix,lopressor,aspirin. He is also on lasix 20 mg po daily. On 2/22,palliative care discussed future plan of care with patient and family. His son Elizabeth Watts was chosen as mPOA and it was decided DNR.       Current Facility-Administered Medications   Medication Dose Route Frequency    metoprolol tartrate (LOPRESSOR) tablet 50 mg  50 mg Oral BID    clopidogrel (PLAVIX) tablet 75 mg  75 mg Oral DAILY    furosemide (LASIX) tablet 20 mg  20 mg Oral DAILY    pantoprazole (PROTONIX) tablet 40 mg  40 mg Oral ACB    sodium chloride (NS) flush 5-10 mL  5-10 mL IntraVENous PRN    acetaminophen (TYLENOL) tablet 500 mg  500 mg Oral Q6H PRN    aspirin delayed-release tablet 81 mg  81 mg Oral DAILY    sodium chloride (NS) flush 5-10 mL  5-10 mL IntraVENous Q8H    sodium chloride (NS) flush 5-10 mL  5-10 mL IntraVENous PRN    magnesium hydroxide (MILK OF MAGNESIA) 400 mg/5 mL oral suspension 30 mL  30 mL Oral DAILY PRN    docusate sodium (COLACE) capsule 100 mg  100 mg Oral BID    morphine injection 2 mg  2 mg IntraVENous Q4H PRN    nitroglycerin (NITROSTAT) tablet 0.4 mg  0.4 mg SubLINGual Q5MIN PRN    atorvastatin (LIPITOR) tablet 40 mg  40 mg Oral QHS    albuterol-ipratropium (DUO-NEB) 2.5 MG-0.5 MG/3 ML  3 mL Nebulization Q4H PRN    ondansetron (ZOFRAN) injection 4 mg  4 mg IntraVENous Q6H PRN    cefTRIAXone (ROCEPHIN) 1 g in sterile water (preservative free) 10 mL IV syringe  1 g IntraVENous Q24H    metoprolol (LOPRESSOR) injection 2.5 mg  2.5 mg IntraVENous Q6H PRN        Review of Systems  Pt saying that he feels fair. Objective:     Visit Vitals    /59 (BP 1 Location: Right arm, BP Patient Position: At rest)    Pulse (!) 108    Temp 98.3 °F (36.8 °C)    Resp 22    Ht 5' 6\" (1.676 m)    Wt 66 kg (145 lb 8.1 oz)    SpO2 99%    BMI 23.48 kg/m2    O2 Flow Rate (L/min): 4 l/min O2 Device: Nasal cannula    Temp (24hrs), Av.1 °F (36.7 °C), Min:97.3 °F (36.3 °C), Max:99.3 °F (37.4 °C)      701 -  1900  In: 60 [P.O.:60]  Out: 500 [Urine:500]  1901 -  0700  In: 359.8 [P.O.:90; I.V.:269.8]  Out: 740 [Urine:740]  P/E  NAD,awake,alert. Jake Puls Heent:perrla,at/nc,mouth dry  Lungs clear - anterior auscultation  Heart s1s2 nl,no m/g  Abdm:soft,not tender,bs present. Extr:no edema,good pedis pulses. Neuro:awake today. Not providing informations    Data Review    Recent Results (from the past 12 hour(s))   PTT    Collection Time: 18  7:45 AM   Result Value Ref Range    aPTT 92.2 (H) 23.0 - 69.1 SEC   METABOLIC PANEL, COMPREHENSIVE    Collection Time: 18  7:55 AM   Result Value Ref Range    Sodium 147 (H) 136 - 145 mmol/L    Potassium 4.1 3.5 - 5.5 mmol/L    Chloride 111 (H) 100 - 108 mmol/L    CO2 23 21 - 32 mmol/L    Anion gap 13 3.0 - 18 mmol/L    Glucose 266 (H) 74 - 99 mg/dL    BUN 77 (H) 7.0 - 18 MG/DL    Creatinine 2.21 (H) 0.6 - 1.3 MG/DL    BUN/Creatinine ratio 35 (H) 12 - 20      GFR est AA 35 (L) >60 ml/min/1.73m2    GFR est non-AA 29 (L) >60 ml/min/1.73m2    Calcium 7.9 (L) 8.5 - 10.1 MG/DL    Bilirubin, total 0.5 0.2 - 1.0 MG/DL    ALT (SGPT) 853 (H) 16 - 61 U/L    AST (SGOT) 928 (H) 15 - 37 U/L    Alk. 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 CHF,systolic with acute exacerbation:    -Pt received iv lasix in ER. Then started on bumex infusion.    -On 2/21 bumex gtt d/c'ed as creatinine went up. Now on lasix 20 mg po daily    -Was on BB,Digoxin. Nitrate gtt. ACE-I/ARB on hold. Digoxin d/marbin    -CXR on 2/20:underexpanded lungs with progressive central vascular congestion and likely mild interstitial edema superimposed upon underlying pulmonary fibrotic change. -ECHO c/w 15 % to 20 %. There was severe hypokinesis / akinesis    -pro-bnp [id number] on admission    -Cardiology following - follow recommendations    -Patient's current medications include:lasix,metoprolol    2. NSTEMI:    -Troponin:10.20 ->25.70 ->36.00    -On aspirin.    -On heparin gtt d/marbin. Plavix started today 2/24    -On BB. Nitrate drip d/c'ed. Now on sl nitro prn.    -ECHO as above    -Cardiology following - follow recommendations    3. UTI:    -Ucx and Blood cx negative. Will d/c ceftriaxone    4. COPD    -On duo-neb    5. Sleep apnea    -Allow CPAP    6. Hypernatremia:likely dehydration    -improving    DVT prophylaxis:on heparin  DNR - family has decided for code on 2/22.   Surrogates:son and daughter  Disposition:tbd - will address placement

## 2018-02-24 NOTE — PROGRESS NOTES
Problem: Falls - Risk of  Goal: *Absence of Falls  Document Silvio Fall Risk and appropriate interventions in the flowsheet.    Outcome: Progressing Towards Goal  Fall Risk Interventions:  Mobility Interventions: Assess mobility with egress test, Bed/chair exit alarm, Communicate number of staff needed for ambulation/transfer         Medication Interventions: Bed/chair exit alarm, Assess postural VS orthostatic hypotension, Patient to call before getting OOB, Teach patient to arise slowly    Elimination Interventions: Bed/chair exit alarm, Call light in reach

## 2018-02-24 NOTE — PROGRESS NOTES
[facility] - Progress Note  Admit Date: 2/20/2018    Assessment:     Hospital Problems  Date Reviewed: 2/20/2018          Codes Class Noted POA    Debility ICD-10-CM: R53.81  ICD-9-CM: 799.3  2/22/2018 Unknown        SOB (shortness of breath) ICD-10-CM: R06.02  ICD-9-CM: 786.05  2/22/2018 Unknown        Advance care planning ICD-10-CM: Z71.89  ICD-9-CM: V65.49  2/22/2018 Unknown        Acute renal failure [facility]) ICD-10-CM: N17.9  ICD-9-CM: 584.9  2/21/2018 No        NSTEMI (non-ST elevated myocardial infarction) [facility]), patient and son have opted for medical therapy over invasive evaluation. No known recurrent CP. Off IV heparin now. Would consolidate metoprolol to BID regimen. Will add Plavix. 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  Received Ativan last night     Objective:      Patient Vitals for the past 8 hrs:   Temp Pulse Resp BP SpO2   02/24/18 0816 99.3 °F (37.4 °C) (!) 104 24 121/70 98 %   02/24/18 0403 - (!) 106 28 - 99 %   02/24/18 0251 98.3 °F (36.8 °C) (!) 117 28 116/50 97 %         Patient Vitals for the past 96 hrs:   Weight   02/23/18 0400 115 lb 1.3 oz (52.2 kg)   02/22/18 1309 145 lb 8.1 oz (66 kg)   02/21/18 1029 145 lb 11.6 oz (66.1 kg)   02/20/18 1008 148 lb 2.4 oz (67.2 kg)                    Intake/Output Summary (Last 24 hours) at 02/24/18 1007  Last data filed at 02/24/18 0706   Gross per 24 hour   Intake            83.68 ml   Output              550 ml   Net          -466.32 ml       Medications  Current Facility-Administered Medications   Medication Dose Route Frequency    furosemide (LASIX) tablet 20 mg  20 mg Oral DAILY    pantoprazole (PROTONIX) tablet 40 mg  40 mg Oral ACB    sodium chloride (NS) flush 5-10 mL  5-10 mL IntraVENous PRN    acetaminophen (TYLENOL) tablet 500 mg  500 mg Oral Q6H PRN    aspirin delayed-release tablet 81 mg  81 mg Oral DAILY    sodium chloride (NS) flush 5-10 mL  5-10 mL IntraVENous Q8H    sodium chloride (NS) flush 5-10 mL  5-10 mL IntraVENous PRN    magnesium hydroxide (MILK OF MAGNESIA) 400 mg/5 mL oral suspension 30 mL  30 mL Oral DAILY PRN    docusate sodium (COLACE) capsule 100 mg  100 mg Oral BID    morphine injection 2 mg  2 mg IntraVENous Q4H PRN    nitroglycerin (NITROSTAT) tablet 0.4 mg  0.4 mg SubLINGual Q5MIN PRN    atorvastatin (LIPITOR) tablet 40 mg  40 mg Oral QHS    albuterol-ipratropium (DUO-NEB) 2.5 MG-0.5 MG/3 ML  3 mL Nebulization Q4H PRN    ondansetron (ZOFRAN) injection 4 mg  4 mg IntraVENous Q6H PRN    metoprolol tartrate (LOPRESSOR) tablet 25 mg  25 mg Oral Q6H    cefTRIAXone (ROCEPHIN) 1 g in sterile water (preservative free) 10 mL IV syringe  1 g IntraVENous Q24H    metoprolol (LOPRESSOR) injection 2.5 mg  2.5 mg IntraVENous Q6H PRN         Physical Exam:  General:  sleeping  Neck:  no JVD  Lungs:  clear to auscultation bilaterally  Heart:  regular rate and rhythm, no S3 or S4  Abdomen:  abdomen is soft without significant tenderness, masses, organomegaly or guarding  Extremities:  extremities normal, atraumatic, no cyanosis or edema    Data Review:     Labs: Results:       Chemistry Recent Labs      02/24/18   0755  02/23/18   0500  02/22/18   0300   GLU  266*  127*  107*   NA  147*  149*  146*   K  4.1  4.1  4.3   CL  111*  113*  111*   CO2  23  24  22   BUN  77*  78*  70*   CREA  2.21*  1.86* 2.20*   CA  7.9*  8.7  8.3*   MG   --   2.9*   --    PHOS   --   4.6   --    AGAP  13  12  13   BUCR  35*  42*  32*   AP  93   --    --    TP  6.9   --    --    ALB  2.9*   --    --    GLOB  4.0   --    --    AGRAT  0.7*   --    --       CBC w/Diff Recent Labs      02/23/18   0500  02/22/18   0300  02/21/18   1030   WBC  10.5  10.3  10.3   RBC  4.66*  4.63*  4.57*   HGB  13.8  13.7  13.5   HCT  42.1  41.3  40.9   PLT  247  244  251      Cardiac Enzymes No results found for: CPK, CK, CKMMB, CKMB, RCK3, CKMBT, CKNDX, CKND1, YADIEL, TROPT, TROIQ, CARL, TROPT, TNIPOC, BNP, BNPP   Coagulation Recent Labs      02/24/18   0745  02/24/18   0000   APTT  92.2*  84.9*       Lipid Panel Lab Results   Component Value Date/Time    Cholesterol, total 140 02/21/2018 03:00 AM    HDL Cholesterol 57 02/21/2018 03:00 AM    LDL,Direct 111 (H) 05/18/2010 02:21 PM    LDL, calculated 66.6 02/21/2018 03:00 AM    VLDL, calculated 16.4 02/21/2018 03:00 AM    Triglyceride 82 02/21/2018 03:00 AM    CHOL/HDL Ratio 2.5 02/21/2018 03:00 AM      BNP No results found for: BNP, BNPP, XBNPT   Liver Enzymes Recent Labs      02/24/18   0755   TP  6.9   ALB  2.9*   AP  93   SGOT  928*      Digoxin    Thyroid Studies Lab Results   Component Value Date/Time    TSH 1.50 02/20/2018 10:45 AM          Signed By: Darryl Estrada MD     February 24, 2018

## 2018-02-24 NOTE — PROGRESS NOTES
2 attempts for OT evaluation @ 8962 & 2707. Both attempts pt sleeping soundly, briefly wakes to touch & voice, immediately closes eyes. Per chart review, pt received Ativan overnight. Will follow up.     Harjinder Siegel MS OTR/L  Office Ext: [phone]  Pager: [phone]

## 2018-02-24 NOTE — ROUTINE PROCESS
1940: Assumed care. Eyes closed. Alert. Oriented to self. Verbally responsive. Denies any pain at this time. On oxygen at 3 LPM. Sats 90's. Eric Mosca breathing noted. HOB put up to 30 degrees. On Heparin gtt at 15 units/kg/hr. Infusing well to left arm. HR at 120's & tachypneic. Patient kept on pulling lines, tubings, plunkett. Reoriented to room & surroundings. 2008: v/s taken. ST at 120's. PRN Metoprolol 2.5 mg IV given. Will continue to monitor. 2300: Patient restless & kept on saying \" It's hot \". Pulling lines, IV tubing & plunkett catheter. Reoriented. Due Med given. 0030: Sleeping. 0110: Still Tachycardic & tachypneic. Restless. PRN breathing treatment & Lopressor IV given. Will continue to monitor. Asked assigned CNA to stay in the room w/ the patient. 0156: APTT result is 84.9. No rate change per protocol. Next APTT due tomorrow. 8485: Patient still tachycardic, tachypneic &  Restless. Pulling tubes & lines. Paged Dr. Melissa Parisi. Informed of the situations. Order obtained for Ativan 1 mg IV X1 dose. 0248: Ativan 1 mg IV given. Will continue to monitor. 0310: Resting quietly. 0291: Sleeping. 0530: Intermittent gasping/agonal breathing/respirations noted. . Sats 97 % on O2 at 4 LPM. Will continue to monitor. 0630: Sleeping. Needs attended. 5854: Bedside and Verbal shift change report given to [facility], RN (oncoming nurse) by me (offgoing nurse). Report included the following information SBAR, Kardex, Intake/Output, MAR and Recent Results. 0800: Dr. Gwendolyn Fuentes on the floor. Updated. Per Gonzalo Vega, recommending to dc Heparin gtt. Order obtained.

## 2018-02-25 NOTE — ROUTINE PROCESS
0800-assessment completed,call bell within reach, no distress noted, discontinued heparin drip per Order. 1100-am due medications given. 1230-no change in condition, repositioned. 1400-resting quietly in bed, no distress noted. 1630-no change in condition, no distress noted, reposition. 1930-Bedside and Verbal shift change report given to Rebeka ACBRAL (oncoming nurse) by Cara Gabriel (offgoing nurse). Report included the following information SBAR, Intake/Output and Recent Results.

## 2018-02-25 NOTE — PROGRESS NOTES
PHYSICAL THERAPY NOTE    13:15PM  - Per nurse Geoff, hold PT. Patient not doing well medically. 13:50PM - Patient . Griffin Mccormack.  Fernandez Adair

## 2018-02-25 NOTE — ROUTINE PROCESS
1935: Assumed care. Patient sleeping. HOB elevated. On oxygen at 3 LPM. Still tachycardic & tachypneic but resting. Will continue to monitor. 2159: Due meds given PRN breathing treatment given   for sob. Will continue to monitor. 2232: No change from previous assessment. 0010: Sleeping. 0230: Sleeping.    0500: No change from previous assessment. 0645: Slept good thru night. Needs attended. Turned & repositioned. Kept warm. 0715: Bedside and Verbal shift change report given to KENYETTA Tellez (oncoming nurse) by me (offgoing nurse). Report included the following information SBAR, Kardex, Intake/Output, MAR and Recent Results.

## 2018-02-25 NOTE — PROGRESS NOTES
Case Management was contacted to contact the [employer] for help in getting Pt's son home from his ship. We were in process of giving information to the [employer], I had a question for the Staff RN, and Geoff apologised when I called and said it had already been taken care of. We informed that to the [employer] person on the phone.

## 2018-02-25 NOTE — ROUTINE PROCESS
1100 per tele, heart rate increased to 150s, came down to 140s, then back down 130s, sustained 120s-130s, dr Montserrat Okeefe paged    1110 notified per Denis Swan in tele, patient converted to afib, still waiting on call from Dr Angelo Guy    [phone] spoke to Dr Angelo Guy, stated to administer the prn metoprolol     3241 notified Dr Agnelo Guy that resp rate is currently 42, received one time order for morphine 1 mg IV     1158 Palliative care nurse practitioner on floor, discussed case with her that patient is becoming less responsive, and may be a good idea to revisit palliative conversation with family, she states she will call them    36 Family arrived, in room    1330 notified per telemetry that patient is in asystole, Dr Angelo Guy notified

## 2018-02-25 NOTE — ROUTINE PROCESS
1100 per tele, heart rate increased to 150s, came down to 140s, then back down 130s, sustained 120s-130s, dr Titi Bailon paged    1110 notified per Joe James in tele, patient converted to afib, still waiting on call from Dr HERNANDEZ [facility]    [phone] spoke to Dr HERNANDEZ [facility], stated to administer the prn metoprolol     2407 notified Dr HERNANDEZ [facility] that resp rate is currently 42, received one time order for morphine 1 mg IV     2942

## 2018-02-25 NOTE — PROGRESS NOTES
Problem: Falls - Risk of  Goal: *Absence of Falls  Document Silvio Fall Risk and appropriate interventions in the flowsheet.    Outcome: Progressing Towards Goal  Fall Risk Interventions:  Mobility Interventions: Assess mobility with egress test         Medication Interventions: Evaluate medications/consider consulting pharmacy, Patient to call before getting OOB    Elimination Interventions: Patient to call for help with toileting needs

## 2018-02-25 NOTE — DISCHARGE SUMMARY
Discharge Summary    Patient: Sascha Hatch               Sex: male          DOA: 2/20/2018         YOB: 1935      Age:  81 y.o.        LOS:  LOS: 5 days                Admit Date: 2/20/2018    Discharge Date: 2/25/2018    Admission Diagnoses: NSTEMI (non-ST elevated myocardial infarction) [facility])    Discharge Diagnoses:    Problem List as of 2/25/2018  Date Reviewed: 2/20/2018          Codes Class Noted - Resolved    Debility ICD-10-CM: R53.81  ICD-9-CM: 799.3  2/22/2018 - Present        SOB (shortness of breath) ICD-10-CM: R06.02  ICD-9-CM: 786.05  2/22/2018 - Present        Advance care planning ICD-10-CM: Z71.89  ICD-9-CM: V65.49  2/22/2018 - Present        Acute renal failure (HCC) ICD-10-CM: N17.9  ICD-9-CM: 584.9  2/21/2018 - Present        NSTEMI (non-ST elevated myocardial infarction) ([facility] ICD-10-CM: I21.4  ICD-9-CM: 410.70  2/20/2018 - Present        Pulmonary edema ICD-10-CM: J81.1  ICD-9-CM: 514  2/20/2018 - Present        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: 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  ICD-9-CM: 786.04, 780.57  2/20/2018 - Present        Cheyne-Sanchez breathing ICD-10-CM: R06.3  ICD-9-CM: 786.04  2/20/2018 - Present        * (Principal)Heart failure, systolic, with acute decompensation (HCC) ICD-10-CM: I50.23  ICD-9-CM: 428.23  2/20/2018 - Present        Acute cystitis ICD-10-CM: N30.00  ICD-9-CM: 595.0  2/20/2018 - Present        Hydrocephalus ICD-10-CM: G91.9  ICD-9-CM: 331.4  10/5/2012 - Present              Discharge Medications:     Current Discharge Medication List      CONTINUE these medications which have NOT CHANGED    Details   ergocalciferol (VITAMIN D2) 50,000 unit capsule Take 50,000 Units by mouth every seven (7) days. acetaminophen (TYLENOL EXTRA STRENGTH) 500 mg tablet Take 500 mg by mouth every six (6) hours as needed for Pain. atorvastatin (LIPITOR) 10 mg tablet Take 10 mg by mouth daily.       aspirin delayed-release 81 mg tablet Take 81 mg by mouth daily.        ondansetron (ZOFRAN ODT) 4 mg disintegrating tablet Take 4 mg by mouth every eight (8) hours as needed for Nausea. cholecalciferol, vitamin D3, (VITAMIN D3) 2,000 unit tab Take 2,000 Units by mouth. albuterol (PROVENTIL HFA, VENTOLIN HFA, PROAIR HFA) 90 mcg/actuation inhaler Take 2 Puffs by inhalation every four (4) hours as needed for Wheezing. Qty: 1 Inhaler, Refills: 0      lisinopril (PRINIVIL, ZESTRIL) 5 mg tablet Take  by mouth daily. omeprazole (PRILOSEC) 20 mg capsule Take 20 mg by mouth daily. gabapentin (NEURONTIN) 300 mg capsule Take 300 mg by mouth three (3) times daily. ascorbic acid (VITAMIN C) 500 mg tablet Take  by mouth. HYDROcodone-acetaminophen (NORCO) 5-325 mg per tablet Take 1-2 tablets PO every 4-6 hours as needed for pain control. If over the counter ibuprofen or acetaminophen was suggested, then only take the vicodin for pain not well controlled with the over the counter medication. Qty: 12 Tab, Refills: 0      ibuprofen (MOTRIN) 600 mg tablet Take 1 Tab by mouth every six (6) hours as needed for Pain. Qty: 20 Tab, Refills: 0      lansoprazole (PREVACID) 30 mg capsule Take 30 mg by mouth Daily (before breakfast). hydrochlorothiazide (HYDRODIURIL) 25 mg tablet Take 25 mg by mouth daily. simvastatin (ZOCOR) 40 mg tablet Take 40 mg by mouth nightly.                Follow-up:patient has     Labs:  Labs: Results:       Chemistry Recent Labs      18   0500  18   0755  18   0500   GLU  121*  266*  127*   NA  151*  147*  149*   K  4.6  4.1  4.1   CL  122*  111*  113*   CO2  18*  23  24   BUN  110*  77*  78*   CREA  2.62*  2.21*  1.86*   CA  8.4*  7.9*  8.7   AGAP  11  13  12   BUCR  42*  35*  42*   AP   --   93   --    TP   --   6.9   --    ALB   --   2.9*   --    GLOB   --   4.0   --    AGRAT   --   0.7*   --       CBC w/Diff Recent Labs      18   0500  18   0500   WBC  9.9  10.5   RBC  4.90  4.66* HGB  14.5  13.8   HCT  45.3  42.1   PLT  149  247   GRANS  83*   --    LYMPH  9*   --    EOS  0   --       Cardiac Enzymes Recent Labs      02/23/18   0500   CPK  245   CKND1  4.2*      Coagulation Recent Labs      02/24/18   0745  02/24/18   0000   APTT  92.2*  84.9*       Lipid Panel Lab Results   Component Value Date/Time    Cholesterol, total 140 02/21/2018 03:00 AM    HDL Cholesterol 57 02/21/2018 03:00 AM    LDL,Direct 111 (H) 05/18/2010 02:21 PM    LDL, calculated 66.6 02/21/2018 03:00 AM    VLDL, calculated 16.4 02/21/2018 03:00 AM    Triglyceride 82 02/21/2018 03:00 AM    CHOL/HDL Ratio 2.5 02/21/2018 03:00 AM      BNP No results for input(s): BNPP in the last 72 hours. Liver Enzymes Recent Labs      02/24/18   0755   TP  6.9   ALB  2.9*   AP  93   SGOT  928*      Thyroid Studies Lab Results   Component Value Date/Time    TSH 1.50 02/20/2018 10:45 AM          Imaging:  CT chest.  1. Multifocal groundglass opacities occurring in a background of relatively  diffuse interlobular septal line thickening; findings in keeping with a  combination of interstitial and alveolar edema.     2. Mild compressive atelectasis at each lung base related to moderate right and  small left pleural effusions.     3. Mild cardiomegaly without pericardial effusion.     4. Upper pole left renal stones, with mild left-sided pelviectasis, incompletely  evaluated. Notably, prior abdominal pelvic CT 11/19/2009 demonstrated a staghorn  calculus in the midportion of the left renal pelvis not demonstrated within the  current field of view. Comparison with more recent imaging of the abdomen or  pelvis recommended.     Consults:  Cardiology    Treatment Team: Treatment Team: Attending Provider: Prateek Peck MD; Consulting Provider: Biju Muse PA-C; Consulting Provider: Prateek Peck MD; Care Manager: Mark White; Utilization Review: Kishor Morgan RN; Occupational Therapist: Trista Lazaro OT; Physical Therapist: Alondra Leiva, PT    Significant Diagnostic Studies:see recent lab results    ECHO:  SUMMARY:  Left ventricle: The ventricle was mildly dilated. Systolic function was   severely reduced by visual assessment. Ejection  fraction was estimated in the range of 15 % to 20 %. There was severe   hypokinesis / akinesis of the mid-apical  anterior, mid anteroseptal, mid inferoseptal, mid-apical inferior, mid   inferolateral, mid anterolateral, apical septal,  apical lateral, and apical wall(s). Only basilar segment seems to be   nilesh. 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 Takotsubo  cardiomyopathy. The study was not technically sufficient to allow evaluation   of LV diastolic function    Hospital Course:  Brayan is a 81 y.o.  male with h/o asthma,copd,was to the emergency room because of short of breath. Patient is a poor historian and no family member at the time of my evaluation,so the history is mostly obtained from the ER attending. It was reported that was brought to the emergency room because of short of breath of several hours. Initially started on iv fluid and antibiotics for possible sepsis by the ER doctor who initially saw patient. When the replacing ER arrived and reviewed the cxr and lab results,it was noted that patient had elevated troponin at 10.20 and CXR showed pulmonary edema. Lasix iv was given and cardiology was consulted for NSTEMI. He was started on heparin drip. ECHO c/w EF 15 % to 20,severe   hypokinesis / akinesis noted. On 2/21,bumex gtt d/c'marbin as creatinine went up to 2.18,pt will be on lasix prn. ACE-I/ARB on hold due to MAREK. Heparin has been d/c'ed,currently patient is on plavix,lopressor,aspirin. He is also on lasix 20 mg po daily. On 2/22,palliative care discussed future plan of care with patient and family. His son Sasha Mo was chosen as mPOA and it was decided DNR. Today was noted with agonal breathing. The family was notified by the palliative care NP. The family arrived in the hospital and few minutes later patient . I was called by the nurse and pronounced patient dead around 1:45 pm    Delilah Mendoza MD  2018

## 2018-02-25 NOTE — PROGRESS NOTES
responded to the death of  Sarkis Rivas, who was a 81 y.o.,male, offering emotional and spiritual support to his wife of 40+ years, an adult son, daughter-in-law and grandson. The  provided the following interventions:  Provided support emotional and spiritual support. Marshal Benito prayer of committal at bedside for patient as well as for family's comfort and strength. Provided patient's nurse with name and contact info of  home (Lorenza Wilder in [facility]). Reviewed chart.     [facility]  Board Certified [address] [place] [address],[address] [place] [address]    [phone]

## 2018-02-25 NOTE — PROGRESS NOTES
Problem: Falls - Risk of  Goal: *Absence of Falls  Document Silvio Fall Risk and appropriate interventions in the flowsheet.    Outcome: Progressing Towards Goal  Fall Risk Interventions:  Mobility Interventions: Assess mobility with egress test    Mentation Interventions: Door open when patient unattended, Reorient patient, Room close to nurse's station    Medication Interventions: Evaluate medications/consider consulting pharmacy, Patient to call before getting OOB    Elimination Interventions: Patient to call for help with toileting needs

## 2018-02-25 NOTE — PROGRESS NOTES
[facility] [place] [facility]: [phone] ([phone])  [facility]: [phone]   [facility]: [phone]    Patient Name: Ihsan Duffy  YOB: 1935    Date of Initial Consult: 2-21-18  Reason for Consult: Care Decisions  Requesting Provider: Gwendolyn Fuentes MD   Primary Care Physician: Sharla Rico MD      SUMMARY:   Ihsan Duffy is a 81y.o. year old, with a past history of HTN, HLD, Sarcoid, ASTHMA/COPD, Asbestosis, who was admitted on 2/20/2018 from ER/Home with a diagnosis of SOB-Acute on Chronic. Current medical issues leading to Palliative Medicine involvement include: patient did r/o in for NSTEMI, has EF of 15-20%, he also has MAREK on hx of staghorn renal calculi and underlying pulmonary disease, asked to support pt/family to establish goals of care. PALLIATIVE DIAGNOSES:   1. Advance care planning  2. Altered mental status   3. Shortness of breath  4. Acute respiratory distress and failure Debility  5. CHF  6. Acute Pulm Edema  7. MAREK       PLAN:   1. Met with patient at bedside-Pt is lethargic and was in respiratory distress . His attending physician and his RN are bedside. Physician ordered Morphine IV now for symptom management. Pt is unable to make any decision for himself at this time. Advance Care Planning- pt compled Advance directive indicating his wife and son Gee Dumont to share in his decision making if he could not participate. His wife-Opal is reported to have limited intellectual capacity. Called p' wife and asked if she could contact her son Gee Dumont so I could talk with him as pt has declined significantly  . She responded slowly and stateds she is waiting for the son Gee Dumont  to come to pick her up. Son returned call,adressed his condition and we are worried that he is starting the dying process. Informed him that the pt has the option for comfort measures/Hospice Care when the burden is more than the benefit.  We are recommending to start the pt on Comfort meuares now to allow us to alleviate symptoms with medications. The son  could not seem to understand that the pt is near death. Informed the son that the pt appears to be dying. He states he is picking up his mother and they will come to see the pt now. Left contact number with pt's son and with RN to call me when family arrived. 2.  Debility-that he was having trouble with his memory-would ask same questions, was unable to hold his urine and was now using depends, had poor hygiene and off balance despite using a walker he has had several falls. He was also with poor appetite-lost 5 lb, thought he was having some trouble swallowing and was overall quite fatigued. 3. SOB worsened. -due to underlying CAD/CHF and may not return to his prior baseline. Has hx of Asbestosis and Asthma/COPD but could not locate any medical w/u or PFTS to quantitate damage. Pulm reviewed CT Chest and believe most c/w fluid overload-PULM EDEMA due to CHF. 4. CHF-no prior hx of CAD and prior ECHO of 2013 showed EF 60% and now 15-20%, so unless this improves he has poor px.  5. MAREK-2015 Creat was 1.14 and on admission it was 1.15, now risen to 2.2, CT of chest shows a staghorn calculus and scheduled for US. Concern this will limit future w/u using contrast.   6. Initial consult note routed to primary continuity provider  7. Communicated plan of care with: Palliative IDT    GOALS OF CARE: Patient has capacity to complete his AMD-named his son Loreto Mary as [place] and elected to avoid heroics at EOL. He did sign his DDNR, stating he would not want INTUBATION even for a potentially reversible process, but would be willing to have BIPAP or other noninvasive measures and other aggressive care to get him able to return home.  Patient/Health Care Proxy Stated Goals: Prolong life    TREATMENT PREFERENCES:   Code Status: Prior    Advance Care Planning:  Advance Care Planning 2/21/2018   Patient's Healthcare Decision Maker is: Legal Next of Kin   Primary Decision Maker Name Samual Kocher   Primary Decision Maker Phone Number [phone]   Confirm Advance Directive None     Merlin Martini Spouse [phone]        Antonio Bigger [phone]     Ab Erickson Child [phone]           Medical Interventions: Limited additional interventions   Other:  As far as possible, the palliative care team has discussed with patient / health care proxy about goals of care / treatment preferences for patient. HISTORY:     History obtained from: Chart  patient used a walker to assist with ambulation  CHIEF COMPLAINT: SOB    HPI/SUBJECTIVE  The patient is:   [x] Verbal and participatory  [] Non-participatory due to:       Clinical Pain Assessment (nonverbal scale for nonverbal patients): Clinical Pain Assessment  Severity: 0   Denies pain/sob  Activity (Movement): Seeking attention through movement or slow, cautious movement    Duration: for how long has pt been experiencing pain (e.g., 2 days, 1 month, years)  Frequency: how often pain is an issue (e.g., several times per day, once every few days, constant)     FUNCTIONAL ASSESSMENT:     Palliative Performance Scale (PPS):  PPS: 10  ECOG  ECOG Status : Completely disabled     PSYCHOSOCIAL/SPIRITUAL SCREENING:      Any spiritual / Rastafarian concerns:  [] Yes /  [x] No    Caregiver Burnout:  [] Yes /  [x] No /  [] No Caregiver Present      Anticipatory grief assessment:   [x] Normal  / [] Maladaptive        REVIEW OF SYSTEMS:     Positive and pertinent negative findings in ROS are noted above in HPI. Has had SOB at home, and quite marked decrease in energy. Appetite as well poor and some trouble swallowing, had fall as well and urine incontinence. The following systems were [] reviewed / [] unable to be reviewed as noted in HPI  Other findings are noted below.   Systems: constitutional, ears/nose/mouth/throat, respiratory, gastrointestinal, genitourinary, musculoskeletal, integumentary, neurologic, psychiatric, endocrine. Positive findings noted below. Modified ESAS Completed by: provider   Fatigue:  (pt is not able  to self report) Drowsiness: 4   Depression: 0 Pain: 0   Anxiety: 0 Nausea: 0   Anorexia: 4 Dyspnea: 4     Constipation: No              PHYSICAL EXAM:     Wt Readings from Last 3 Encounters:   02/25/18 64.7 kg (142 lb 9.6 oz)   01/15/18 79.4 kg (175 lb)   07/27/14 78.9 kg (174 lb)     Blood pressure 115/64, pulse (!) 120, temperature 98.2 °F (36.8 °C), resp. rate (!) 42, height 5' 6\" (1.676 m), weight 64.7 kg (142 lb 9.6 oz), SpO2 93 %. Pain:  Pain Scale 1: Adult Nonverbal Pain Scale  Pain Intensity 1: 3      Pain Intervention(s) 1: Medication (see MAR)  Last bowel movement:     Constitutional: eldelry frail, distressed , [place] male with oxygen ,tachypnea with periods of apnea   Eyes: pupils equal, anicteric,   ENMT: no nasal discharge, dry mucous membranes, edentulous   Respiratory: breathing  labored, symmetric  Gastrointestinal: soft non-tender, +bowel sounds  Musculoskeletal: no deformity, no tenderness to palpation  Skin: warm, dry  Neurologic: not following commands  Psychiatric: unable to assess affect, hallucinations  Other:       HISTORY:     Principal Problem:    Heart failure, systolic, with acute decompensation ([address] (2/20/2018)    Active Problems:    NSTEMI (non-ST elevated myocardial infarction) ([address] (2/20/2018)      Pulmonary edema (2/20/2018)      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 No pertinent surgical history. History reviewed. No pertinent family history. History reviewed, no pertinent family history. Social History   Substance Use Topics    Smoking status: Never Smoker    Smokeless tobacco: Never Used    Alcohol use No     No Known Allergies   No current facility-administered medications for this encounter. Current Outpatient Prescriptions   Medication Sig    ergocalciferol (VITAMIN D2) 50,000 unit capsule Take 50,000 Units by mouth every seven (7) days.  acetaminophen (TYLENOL EXTRA STRENGTH) 500 mg tablet Take 500 mg by mouth every six (6) hours as needed for Pain.  atorvastatin (LIPITOR) 10 mg tablet Take 10 mg by mouth daily.  aspirin delayed-release 81 mg tablet Take 81 mg by mouth daily.  ondansetron (ZOFRAN ODT) 4 mg disintegrating tablet Take 4 mg by mouth every eight (8) hours as needed for Nausea.  cholecalciferol, vitamin D3, (VITAMIN D3) 2,000 unit tab Take 2,000 Units by mouth.  albuterol (PROVENTIL HFA, VENTOLIN HFA, PROAIR HFA) 90 mcg/actuation inhaler Take 2 Puffs by inhalation every four (4) hours as needed for Wheezing.  lisinopril (PRINIVIL, ZESTRIL) 5 mg tablet Take  by mouth daily.  omeprazole (PRILOSEC) 20 mg capsule Take 20 mg by mouth daily.  gabapentin (NEURONTIN) 300 mg capsule Take 300 mg by mouth three (3) times daily.  ascorbic acid (VITAMIN C) 500 mg tablet Take  by mouth.  HYDROcodone-acetaminophen (NORCO) 5-325 mg per tablet Take 1-2 tablets PO every 4-6 hours as needed for pain control. If over the counter ibuprofen or acetaminophen was suggested, then only take the vicodin for pain not well controlled with the over the counter medication.  ibuprofen (MOTRIN) 600 mg tablet Take 1 Tab by mouth every six (6) hours as needed for Pain.  lansoprazole (PREVACID) 30 mg capsule Take 30 mg by mouth Daily (before breakfast).  hydrochlorothiazide (HYDRODIURIL) 25 mg tablet Take 25 mg by mouth daily.       simvastatin (ZOCOR) 40 mg tablet Take 40 mg by mouth nightly. LAB AND IMAGING FINDINGS:     Lab Results   Component Value Date/Time    WBC 9.9 02/25/2018 05:00 AM    HGB 14.5 02/25/2018 05:00 AM    PLATELET 453 40/74/6631 05:00 AM     Lab Results   Component Value Date/Time    Sodium 151 (H) 02/25/2018 05:00 AM    Potassium 4.6 02/25/2018 05:00 AM    Chloride 122 (H) 02/25/2018 05:00 AM    CO2 18 (L) 02/25/2018 05:00 AM     (H) 02/25/2018 05:00 AM    Creatinine 2.62 (H) 02/25/2018 05:00 AM    Calcium 8.4 (L) 02/25/2018 05:00 AM    Magnesium 2.9 (H) 02/23/2018 05:00 AM    Phosphorus 4.6 02/23/2018 05:00 AM      Lab Results   Component Value Date/Time    AST (SGOT) 928 (H) 02/24/2018 07:55 AM    Alk. phosphatase 93 02/24/2018 07:55 AM    Protein, total 6.9 02/24/2018 07:55 AM    Albumin 2.9 (L) 02/24/2018 07:55 AM    Globulin 4.0 02/24/2018 07:55 AM     Lab Results   Component Value Date/Time    INR 1.2 02/20/2018 10:45 AM    Prothrombin time 14.6 02/20/2018 10:45 AM    aPTT 92.2 (H) 02/24/2018 07:45 AM      No results found for: IRON, FE, TIBC, IBCT, PSAT, FERR   No results found for: PH, PCO2, PO2  No components found for: [place]   Lab Results   Component Value Date/Time     02/23/2018 05:00 AM    CK - MB 10.3 (H) 02/23/2018 05:00 AM              Total time: 45 minutes  Counseling / coordination time, spent as noted above: 90 minutes  > 50% counseling / coordination: yes with patient, son  Prolonged service was provided for  []30 min   []75 min in face to face time in the presence of the patient, spent as noted above.

## 2018-02-25 NOTE — PROGRESS NOTES
[facility] - Progress Note  Admit Date: 2/20/2018    Assessment:     Hospital Problems  Date Reviewed: 2/20/2018          Codes Class Noted POA    Debility ICD-10-CM: R53.81  ICD-9-CM: 799.3  2/22/2018 Unknown        SOB (shortness of breath) ICD-10-CM: R06.02  ICD-9-CM: 786.05  2/22/2018 Unknown        Advance care planning ICD-10-CM: Z71.89  ICD-9-CM: V65.49  2/22/2018 Unknown        Acute renal failure [facility]) ICD-10-CM: N17.9  ICD-9-CM: 584.9  2/21/2018 No        NSTEMI (non-ST elevated myocardial infarction) [facility]), patient and son have opted for medical therapy over invasive evaluation. 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 Defer to Primary team  Over all prognosis poor. Patient DNR now      Subjective:     Appears lethargic. No discomfort.      Objective:      Patient Vitals for the past 8 hrs:   Temp Pulse Resp BP SpO2   02/25/18 0909 98.5 °F (36.9 °C) (!) 115 20 115/64 93 %   02/25/18 0818 - (!) 118 - 106/67 -   02/25/18 0500 97.2 °F (36.2 °C) (!) 115 24 110/69 94 %         Patient Vitals for the past 96 hrs:   Weight   02/24/18 1059 145 lb 8.1 oz (66 kg)   02/23/18 0400 115 lb 1.3 oz (52.2 kg)   02/22/18 1309 145 lb 8.1 oz (66 kg)   02/21/18 1029 145 lb 11.6 oz (66.1 kg)                    Intake/Output Summary (Last 24 hours) at 02/25/18 1004  Last data filed at 02/25/18 0955   Gross per 24 hour   Intake 540 ml   Output              501 ml   Net               39 ml       Medications  Current Facility-Administered Medications   Medication Dose Route Frequency    metoprolol tartrate (LOPRESSOR) tablet 50 mg  50 mg Oral BID    clopidogrel (PLAVIX) tablet 75 mg  75 mg Oral DAILY    furosemide (LASIX) tablet 20 mg  20 mg Oral DAILY    pantoprazole (PROTONIX) tablet 40 mg  40 mg Oral ACB    sodium chloride (NS) flush 5-10 mL  5-10 mL IntraVENous PRN    acetaminophen (TYLENOL) tablet 500 mg  500 mg Oral Q6H PRN    aspirin delayed-release tablet 81 mg  81 mg Oral DAILY    sodium chloride (NS) flush 5-10 mL  5-10 mL IntraVENous Q8H    sodium chloride (NS) flush 5-10 mL  5-10 mL IntraVENous PRN    magnesium hydroxide (MILK OF MAGNESIA) 400 mg/5 mL oral suspension 30 mL  30 mL Oral DAILY PRN    docusate sodium (COLACE) capsule 100 mg  100 mg Oral BID    morphine injection 2 mg  2 mg IntraVENous Q4H PRN    nitroglycerin (NITROSTAT) tablet 0.4 mg  0.4 mg SubLINGual Q5MIN PRN    atorvastatin (LIPITOR) tablet 40 mg  40 mg Oral QHS    albuterol-ipratropium (DUO-NEB) 2.5 MG-0.5 MG/3 ML  3 mL Nebulization Q4H PRN    ondansetron (ZOFRAN) injection 4 mg  4 mg IntraVENous Q6H PRN    metoprolol (LOPRESSOR) injection 2.5 mg  2.5 mg IntraVENous Q6H PRN         Physical Exam:  General:  sleeping  Neck:  no JVD  Lungs:  No obvious rales  Heart:  regular rate and rhythm, no S3 or S4  Abdomen:  abdomen is soft without significant tenderness, masses, organomegaly or guarding  Extremities:  extremities normal, atraumatic, no cyanosis or edema    Data Review:     Labs: Results:       Chemistry Recent Labs      02/25/18   0500  02/24/18   0755  02/23/18   0500   GLU  121*  266*  127*   NA  151*  147*  149*   K  4.6  4.1  4.1   CL  122*  111*  113*   CO2  18*  23  24   BUN  110*  77*  78*   CREA  2.62*  2.21*  1.86*   CA  8.4*  7.9*  8.7   MG   --    --   2.9*   PHOS   --    --   4.6   AGAP  11  13  12   BUCR  42*  35*  42* AP   --   93   --    TP   --   6.9   --    ALB   --   2.9*   --    GLOB   --   4.0   --    AGRAT   --   0.7*   --       CBC w/Diff Recent Labs      02/25/18   0500  02/23/18   0500   WBC  9.9  10.5   RBC  4.90  4.66*   HGB  14.5  13.8   HCT  45.3  42.1   PLT  149  247   GRANS  83*   --    LYMPH  9*   --    EOS  0   --       Cardiac Enzymes No results found for: CPK, CK, CKMMB, CKMB, RCK3, CKMBT, CKNDX, CKND1, YADIEL, TROPT, TROIQ, CARL, TROPT, TNIPOC, BNP, BNPP   Coagulation Recent Labs      02/24/18   0745  02/24/18   0000   APTT  92.2*  84.9*       Lipid Panel Lab Results   Component Value Date/Time    Cholesterol, total 140 02/21/2018 03:00 AM    HDL Cholesterol 57 02/21/2018 03:00 AM    LDL,Direct 111 (H) 05/18/2010 02:21 PM    LDL, calculated 66.6 02/21/2018 03:00 AM    VLDL, calculated 16.4 02/21/2018 03:00 AM    Triglyceride 82 02/21/2018 03:00 AM    CHOL/HDL Ratio 2.5 02/21/2018 03:00 AM      BNP No results found for: BNP, BNPP, XBNPT   Liver Enzymes Recent Labs      02/24/18   0755   TP  6.9   ALB  2.9*   AP  93   SGOT  928*      Digoxin    Thyroid Studies Lab Results   Component Value Date/Time    TSH 1.50 02/20/2018 10:45 AM          Signed By: Benson Cruz MD     February 25, 2018

## 2018-02-25 NOTE — PALLIATIVE CARE
Palliative Medcine NP met with pt bedside, RN and DR Yolanda Michael are bedside. Mr Naveen Rosales appears to be in distress ,he is lethargic and has labored respirations,with tachycardia . Pt is DNR/DNI per his request.      Palliative medicine NP called LINDSEY son Starla Clayton ([phone]) and had no answer ,left message for him to return call. Called pt's Wife Jana Al and she is waiting for pt's son Starla Clayton to come to pick her up to come to hospital . Informed her that we are very worried for her . Will follow up to have discussion with [facility]PO[facility]. Left contact number for Pt's son Starla Clayton and for RN to marjorie me when family arrive  .     Macdonald Goodell NP     Yuliana Cruz [phone] Monday through Friday

## 2018-02-26 LAB
BACTERIA SPEC CULT: NORMAL
SERVICE CMNT-IMP: NORMAL